# Patient Record
Sex: MALE | Race: WHITE | Employment: OTHER | URBAN - METROPOLITAN AREA
[De-identification: names, ages, dates, MRNs, and addresses within clinical notes are randomized per-mention and may not be internally consistent; named-entity substitution may affect disease eponyms.]

---

## 2018-02-01 ENCOUNTER — OFFICE VISIT (OUTPATIENT)
Dept: INTERNAL MEDICINE CLINIC | Age: 73
End: 2018-02-01
Payer: MEDICARE

## 2018-02-01 VITALS
HEART RATE: 69 BPM | HEIGHT: 69 IN | DIASTOLIC BLOOD PRESSURE: 78 MMHG | BODY MASS INDEX: 29.47 KG/M2 | OXYGEN SATURATION: 98 % | WEIGHT: 199 LBS | SYSTOLIC BLOOD PRESSURE: 132 MMHG

## 2018-02-01 DIAGNOSIS — G89.29 CHRONIC RIGHT SHOULDER PAIN: Primary | ICD-10-CM

## 2018-02-01 DIAGNOSIS — F43.20 ADJUSTMENT DISORDER, UNSPECIFIED TYPE: ICD-10-CM

## 2018-02-01 DIAGNOSIS — Z98.890 HISTORY OF SHOULDER SURGERY: ICD-10-CM

## 2018-02-01 DIAGNOSIS — Z85.46 HISTORY OF PROSTATE CANCER: ICD-10-CM

## 2018-02-01 DIAGNOSIS — K63.5 POLYP OF COLON, UNSPECIFIED PART OF COLON, UNSPECIFIED TYPE: ICD-10-CM

## 2018-02-01 DIAGNOSIS — Z77.098 AGENT ORANGE EXPOSURE: ICD-10-CM

## 2018-02-01 DIAGNOSIS — M25.511 CHRONIC RIGHT SHOULDER PAIN: Primary | ICD-10-CM

## 2018-02-01 DIAGNOSIS — Z00.00 PREVENTATIVE HEALTH CARE: ICD-10-CM

## 2018-02-01 DIAGNOSIS — F10.11 HISTORY OF ALCOHOL ABUSE: ICD-10-CM

## 2018-02-01 PROCEDURE — 99204 OFFICE O/P NEW MOD 45 MIN: CPT | Performed by: NURSE PRACTITIONER

## 2018-02-01 RX ORDER — VITAMIN B COMPLEX
100 TABLET ORAL DAILY
Qty: 30 CAPSULE | Refills: 0
Start: 2018-02-01

## 2018-02-01 RX ORDER — UREA 10 %
800 LOTION (ML) TOPICAL DAILY
Qty: 30 TABLET | Refills: 3
Start: 2018-02-01

## 2018-02-01 RX ORDER — ARGININE HCL 1000 MG
1800 TABLET ORAL 2 TIMES DAILY
Qty: 30 TABLET | Refills: 0
Start: 2018-02-01

## 2018-02-01 RX ORDER — VIT C/B6/B5/MAGNESIUM/HERB 173 50-5-6-5MG
500 CAPSULE ORAL DAILY
Qty: 30 CAPSULE | Refills: 0
Start: 2018-02-01

## 2018-02-01 RX ORDER — CHOLECALCIFEROL (VITAMIN D3) 125 MCG
500 CAPSULE ORAL 2 TIMES DAILY
Qty: 30 TABLET | Refills: 3
Start: 2018-02-01

## 2018-02-01 RX ORDER — FERROUS SULFATE 7.5 MG/0.5
65 SYRINGE (EA) ORAL 2 TIMES DAILY
COMMUNITY
End: 2018-03-05 | Stop reason: ALTCHOICE

## 2018-02-01 RX ORDER — L. RHAMNOSUS GG/INULIN 20B-200 MG
200 CAPSULE ORAL 2 TIMES DAILY
Qty: 60 CAPSULE | Refills: 5
Start: 2018-02-01

## 2018-02-01 RX ORDER — ASCORBIC ACID 500 MG
500 TABLET ORAL 2 TIMES DAILY
COMMUNITY

## 2018-02-01 RX ORDER — NIACINAMIDE 500 MG
400 TABLET, EXTENDED RELEASE ORAL 2 TIMES DAILY
Qty: 180 CAPSULE | Refills: 0
Start: 2018-02-01 | End: 2018-11-14

## 2018-02-01 RX ORDER — GLUCOSAMINE/CHONDR SU A SOD 750-600 MG
750 TABLET ORAL DAILY
Qty: 30 CAPSULE | Refills: 0
Start: 2018-02-01 | End: 2018-11-14

## 2018-02-01 RX ORDER — IBUPROFEN 800 MG/1
800 TABLET ORAL 2 TIMES DAILY
COMMUNITY
End: 2018-03-29 | Stop reason: DRUGHIGH

## 2018-02-01 RX ORDER — GARLIC 180 MG
1000 TABLET, DELAYED RELEASE (ENTERIC COATED) ORAL 2 TIMES DAILY
Qty: 60 CAPSULE | Refills: 0
Start: 2018-02-01 | End: 2018-11-14

## 2018-02-01 RX ORDER — MULTIVITAMIN
1 TABLET ORAL DAILY
Qty: 30 TABLET | Refills: 0
Start: 2018-02-01

## 2018-02-01 RX ORDER — LANOLIN ALCOHOL/MO/W.PET/CERES
50 CREAM (GRAM) TOPICAL DAILY
Qty: 30 TABLET | Refills: 3
Start: 2018-02-01

## 2018-02-01 ASSESSMENT — PATIENT HEALTH QUESTIONNAIRE - PHQ9
SUM OF ALL RESPONSES TO PHQ QUESTIONS 1-9: 1
1. LITTLE INTEREST OR PLEASURE IN DOING THINGS: 0
SUM OF ALL RESPONSES TO PHQ9 QUESTIONS 1 & 2: 1
2. FEELING DOWN, DEPRESSED OR HOPELESS: 1

## 2018-02-01 ASSESSMENT — ENCOUNTER SYMPTOMS
BLOOD IN STOOL: 0
SHORTNESS OF BREATH: 0
ABDOMINAL PAIN: 0
COUGH: 0
EYE DISCHARGE: 0

## 2018-02-01 NOTE — TELEPHONE ENCOUNTER
All medication pending to be added to pt medication list   No print on all for Rx do not send to pharmacy

## 2018-02-01 NOTE — PROGRESS NOTES
[facility] PHYSICIANS  [facility]  [address]  [address] [facility] [address]  Dept: [phone]  Dept Fax: [phone]    Christianne Mendiola is a 71 y.o. male who presents today for his medical conditions/complaints as noted below. Christianne Mendiola is c/o of New Patient (Pt here today for new patient appt ); Annual Exam (pt will like yearly physical ); Rash (pt has spots on both legs and back that he will like looked over ); and Shoulder Pain (right shoulder pain, pt had total shoulder replacement 02/2017)        HPI:     Patient presents with:  New Patient: Pt here today for new patient appt , moved here from [facility]   Annual Exam: pt will like yearly physical   Rash: pt has spots on both legs and back that he will like looked over R leg x 1 yr; L leg x 1 mo. 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 Used arm/shoulder extensively after surg. Surg done in [place] , strength not back to baseline taking nsaids since surg. H/o Prostate CA , saw URO minimally   Upset about wt gain, loss of phys activity joined Y   H/o colon polyps.  recomm colonoscopy q 5   [facility], he does not prefer to go         Past Medical History:   Diagnosis Date    Agent orange exposure     Alcoholism ([address]     recovery 1995    Anemia     Cancer ([address]     Prostate-  radiation complete 12/2010    Colon polyps     colonoscopy recomm q 5 y     Depression     Hyperlipidemia     Hypertension     prior to wt loss     Osteoarthritis     PTSD (post-traumatic stress disorder)       Past Surgical History:   Procedure Laterality Date    LAMINECTOMY      C4 C5  1995    SHOULDER SURGERY Right     replacement 2017        Family History   Problem Relation Age of Onset    Cancer Mother      lung mets to brain     Cancer Brother      prostate        Social History   Substance Use Topics    Smoking status: Former Smoker     Packs/day: 1.00     Years: 50.00    Smokeless tobacco: Never Used      Comment: quit 2000    Alcohol use No      Comment: quit 1990s ; prev qd drinking       Current Outpatient Prescriptions   Medication Sig Dispense Refill    vitamin C (ASCORBIC ACID) 500 MG tablet Take 500 mg by mouth 2 times daily      ibuprofen (ADVIL;MOTRIN) 800 MG tablet Take 800 mg by mouth 2 times daily      ferrous sulfate (CANDACE-IN-SOL) 75 (15 Fe) MG/ML solution Take 65 mg by mouth 2 times daily      aspirin 81 MG tablet Take 81 mg by mouth daily      magnesium hydroxide (MILK OF MAGNESIA) 400 MG/5ML suspension Take by mouth daily as needed for Constipation       No current facility-administered medications for this visit. No Known Allergies      Subjective:      Review of Systems   Constitutional: Negative for activity change, chills, fatigue and fever. Eyes: Negative for discharge and visual disturbance. Wears glasses    Respiratory: Negative for cough and shortness of breath. Cardiovascular: Negative for chest pain and leg swelling. Gastrointestinal: Negative for abdominal pain and blood in stool. Endocrine: Negative for cold intolerance and heat intolerance. Genitourinary: Negative for dysuria and flank pain. Incomplete emptying    Musculoskeletal: Positive for arthralgias. Negative for joint swelling and myalgias. Skin: Positive for rash. Negative for pallor. Neurological: Negative for dizziness and headaches. Psychiatric/Behavioral: Positive for dysphoric mood. Negative for hallucinations and suicidal ideas. The patient is not nervous/anxious. R/t moving here from [place], care giving, surrounded by family/noise        Objective:     Physical Exam   Constitutional: He is oriented to person, place, and time. He appears well-developed and well-nourished. HENT:   Head: Normocephalic and atraumatic. Eyes: Conjunctivae and EOM are normal. Right eye exhibits no discharge. Left eye exhibits no discharge. Right conjunctiva is not injected.  Right conjunctiva has no hemorrhage. Left conjunctiva is not injected. Left conjunctiva has no hemorrhage. No scleral icterus. Right eye exhibits normal extraocular motion and no nystagmus. Left eye exhibits normal extraocular motion and no nystagmus. Wearing glasses    Neck: Normal range of motion. Neck supple. Carotid bruit is not present. No tracheal deviation present. No thyromegaly present. Cardiovascular: Normal rate, regular rhythm and normal heart sounds. Pulses:       Carotid pulses are 2+ on the right side, and 2+ on the left side. Radial pulses are 2+ on the right side, and 2+ on the left side. Dorsalis pedis pulses are 2+ on the right side, and 2+ on the left side. Pulmonary/Chest: Effort normal and breath sounds normal. He has no wheezes. He has no rales. Abdominal: Soft. Bowel sounds are normal. He exhibits no distension and no mass. There is no tenderness. There is no rebound and no guarding. Musculoskeletal: He exhibits no edema. R shldr rom wnl but assoc w pain abduction and ER. No pain IR or flexion/ext. No crepitus noted    Lymphadenopathy:        Head (right side): No submental, no submandibular and no tonsillar adenopathy present. Head (left side): No submental, no submandibular and no tonsillar adenopathy present. He has no cervical adenopathy. Neurological: He is alert and oriented to person, place, and time. Upper/lower extrem strength 5/5    Skin: Skin is warm and dry. Psychiatric: He has a normal mood and affect. Nursing note and vitals reviewed. /78   Pulse 69   Ht 5' 8.5\" (1.74 m)   Wt 199 lb (90.3 kg)   SpO2 98%   BMI 29.81 kg/m²     CBC: No results found for: WBC, RBC, HGB, HCT, MCV, MCH, MCHC, RDW, PLT, MPV  CMP:  No results found for: NA, K, CL, CO2, BUN, CREATININE, GFRAA, AGRATIO, LABGLOM, GLUCOSE, PROT, LABALBU, CALCIUM, BILITOT, ALKPHOS, AST, ALT  No results found for: LABA1C        Assessment:      1.  Chronic right shoulder pain  [place], [address]   2. Polyp of colon, unspecified part of colon, unspecified type     3. Adjustment disorder, unspecified type     4. Agent orange exposure     5. History of prostate cancer  [facility]    PSA, Diagnostic   6. History of shoulder surgery  [place]ino [place], [address]   7. History of alcohol abuse     8. Preventative health care  Comprehensive Metabolic Panel    CBC    Lipid Panel    Hepatitis C Antibody       Plan:      1. Chronic right shoulder pain  Tonya Paulino MD, [address]   2. Polyp of colon, unspecified part of colon, unspecified type     3. Adjustment disorder, unspecified type     4. Agent orange exposure     5. History of prostate cancer  [facility]    PSA, Diagnostic   6. History of shoulder surgery  Tonya Paulino MD, [address]   7. History of alcohol abuse     8. Preventative health care  Comprehensive Metabolic Panel    CBC    Lipid Panel    Hepatitis C Antibody       Leodan received counseling on the following healthy behaviors: medication adherence  Reviewed prior labs and health maintenance  Continue current medications, diet and exercise. Discussed use, benefit, and side effects of prescribed medications. Barriers to medication compliance addressed. Patient given educational materials - see patient instructions  Was a self-tracking handout given in paper form or via [employer]? Yes    Requested Prescriptions      No prescriptions requested or ordered in this encounter       All patient questions answered. Patient voiced understanding. Quality Measures    Body mass index is 29.81 kg/m². Elevated. Weight control planned discussed Healthy diet and regular exercise. BP: 132/78. Blood pressure is normal. Treatment plan consists of No treatment change needed.     Fall Risk 2/1/2018   2 or more falls in past year? no   Fall with injury in reviewed and agree with the nursing documentation.     Electronically signed by Alvino Patrick NP on 2/1/2018 at 1:43 PM

## 2018-02-12 DIAGNOSIS — Z85.46 HISTORY OF PROSTATE CANCER: ICD-10-CM

## 2018-02-12 DIAGNOSIS — Z00.00 PREVENTATIVE HEALTH CARE: ICD-10-CM

## 2018-02-12 LAB
ALBUMIN SERPL-MCNC: 3.9 G/DL
ALP BLD-CCNC: 64 U/L
ALT SERPL-CCNC: 28 U/L
ANION GAP SERPL CALCULATED.3IONS-SCNC: NORMAL MMOL/L
AST SERPL-CCNC: 24 U/L
BASOPHILS ABSOLUTE: ABNORMAL /ΜL
BASOPHILS RELATIVE PERCENT: ABNORMAL %
BILIRUB SERPL-MCNC: 0.7 MG/DL (ref 0.1–1.4)
BUN BLDV-MCNC: 23 MG/DL
CALCIUM SERPL-MCNC: 9.8 MG/DL
CHLORIDE BLD-SCNC: 105 MMOL/L
CHOLESTEROL, TOTAL: 171 MG/DL
CHOLESTEROL/HDL RATIO: 3.4
CO2: 28 MMOL/L
CREAT SERPL-MCNC: 0.8 MG/DL
EOSINOPHILS ABSOLUTE: ABNORMAL /ΜL
EOSINOPHILS RELATIVE PERCENT: ABNORMAL %
GFR CALCULATED: NORMAL
GLUCOSE BLD-MCNC: 84 MG/DL
HCT VFR BLD CALC: 40.3 % (ref 41–53)
HDLC SERPL-MCNC: 51 MG/DL (ref 35–70)
HEMOGLOBIN: 13.8 G/DL (ref 13.5–17.5)
LDL CHOLESTEROL CALCULATED: 104 MG/DL (ref 0–160)
LYMPHOCYTES ABSOLUTE: ABNORMAL /ΜL
LYMPHOCYTES RELATIVE PERCENT: ABNORMAL %
MCH RBC QN AUTO: 30.4 PG
MCHC RBC AUTO-ENTMCNC: 34.2 G/DL
MCV RBC AUTO: 89 FL
MONOCYTES ABSOLUTE: ABNORMAL /ΜL
MONOCYTES RELATIVE PERCENT: ABNORMAL %
NEUTROPHILS ABSOLUTE: ABNORMAL /ΜL
NEUTROPHILS RELATIVE PERCENT: ABNORMAL %
PLATELET # BLD: 220 K/ΜL
PMV BLD AUTO: ABNORMAL FL
POTASSIUM SERPL-SCNC: 5.2 MMOL/L
PROSTATE SPECIFIC ANTIGEN: 0.66 NG/ML
RBC # BLD: 4.52 10^6/ΜL
SODIUM BLD-SCNC: 144 MMOL/L
TOTAL PROTEIN: 6.6
TRIGL SERPL-MCNC: 81 MG/DL
VLDLC SERPL CALC-MCNC: 16 MG/DL
WBC # BLD: 6.69 10^3/ML

## 2018-02-13 ENCOUNTER — TELEPHONE (OUTPATIENT)
Dept: UROLOGY | Age: 73
End: 2018-02-13

## 2018-02-13 ENCOUNTER — OFFICE VISIT (OUTPATIENT)
Dept: UROLOGY | Age: 73
End: 2018-02-13
Payer: MEDICARE

## 2018-02-13 VITALS
HEIGHT: 69 IN | HEART RATE: 56 BPM | BODY MASS INDEX: 29.49 KG/M2 | WEIGHT: 199.08 LBS | TEMPERATURE: 97.9 F | RESPIRATION RATE: 16 BRPM | SYSTOLIC BLOOD PRESSURE: 130 MMHG | DIASTOLIC BLOOD PRESSURE: 76 MMHG

## 2018-02-13 DIAGNOSIS — N52.35 ERECTILE DYSFUNCTION FOLLOWING RADIATION THERAPY: ICD-10-CM

## 2018-02-13 DIAGNOSIS — C61 PROSTATE CANCER (HCC): Primary | ICD-10-CM

## 2018-02-13 PROCEDURE — 99204 OFFICE O/P NEW MOD 45 MIN: CPT | Performed by: UROLOGY

## 2018-02-13 ASSESSMENT — ENCOUNTER SYMPTOMS
COUGH: 0
SHORTNESS OF BREATH: 0
WHEEZING: 0
BACK PAIN: 0
EYE REDNESS: 0
VOMITING: 0
NAUSEA: 0
ABDOMINAL PAIN: 0
COLOR CHANGE: 0
EYE PAIN: 0

## 2018-02-13 NOTE — PROGRESS NOTES
[facility]  [facility]OLOGY SPECIALISTS - [place]  [address]  [address]  [address] [place] [id number]  Dept: [phone]  Dept Fax: [address] [facility] Urology Office Note - New patient    Patient:  Guera Bagley  YOB: 1945  Date: 2/13/2018    The patient is a 71 y.o. male who presents today for evaluation of the following problems:   Chief Complaint   Patient presents with    Prostate Cancer     hx, [place]    referred by Cash Morales NP.      HPI  Here from [place] as a New Pt with Dx of prostate cancer in 2010 tx with radiation, had no ADT. His PSA originally was over 4 at [facility] and he has noted the PSA's have decreased since radiation. His records have not come in but he recalls his most recent PSA in [place] less than one. He has weak stream, intermittency, and nocturia x4. He is currently on no medication for these s/s. He takes a lot of herbs. He has no dysuria, no hematuria, no stool issues. His PSA is less that 0.66. Overall, he has done well with the cancer. He has a stream that stops and starts and he dribbles at the end. He does not have severe frequency. He does not have any leakage. He has problems with obtaining and maintaining erections. He has only tried natural supplements. He can obtain an erection, but it is weak and it fades. (Patient's old records have been requested, reviewed and summarized in today's note.)    Summary of old records: N/A    Additional History: N/A    Procedures Today: N/A    Last several PSA's:  Lab Results   Component Value Date    PSA 0.66 02/07/2018     Last total testosterone:  No results found for: TESTOSTERONE  Urinalysis today:  No results found for this visit on 02/13/18. AUA Symptom Score (2/13/2018):   INCOMPLETE EMPTYING: How often have you had the sensation of not emptying your bladder?: Less than 1 to 5 times  FREQUENCY: How often do you have to urinate less than every two hours?: Less than 1 to 5

## 2018-02-13 NOTE — LETTER
[facility]  [facility] - [place]  [address]  [address]  [address] [facility] [address]  Dept: [phone]  Dept Fax: [phone]        2/13/18    Patient: Los Guthrie  YOB: 1945    Dear Nii Curiel NP,    I had the pleasure of seeing one of your patients, Unknown Seats today in the office today. Below are the relevant portions of my assessment and plan of care. IMPRESSION:     1. Prostate cancer ([address]    2. Erectile dysfunction following radiation therapy        PLAN:        Overall doing well  PSA is stable. Discussed Flomax for intermittent stream and post void dribble, wants to hold off for now. He is interested in Viagra. F/U in 6 months. Prescriptions Ordered:  No orders of the defined types were placed in this encounter. Orders Placed:  No orders of the defined types were placed in this encounter. Thank you for allowing me to participate in the care of this patient. I will keep you updated on this patient's follow up and I look forward to serving you and your patients again in the future.         Pineda Ramirez MD

## 2018-02-16 ENCOUNTER — TELEPHONE (OUTPATIENT)
Dept: ORTHOPEDIC SURGERY | Age: 73
End: 2018-02-16

## 2018-02-27 ENCOUNTER — OFFICE VISIT (OUTPATIENT)
Dept: ORTHOPEDIC SURGERY | Age: 73
End: 2018-02-27
Payer: MEDICARE

## 2018-02-27 ENCOUNTER — HOSPITAL ENCOUNTER (OUTPATIENT)
Age: 73
Discharge: HOME | End: 2018-02-27
Payer: MEDICARE

## 2018-02-27 DIAGNOSIS — M25.511 CHRONIC RIGHT SHOULDER PAIN: ICD-10-CM

## 2018-02-27 DIAGNOSIS — G89.29 CHRONIC RIGHT SHOULDER PAIN: ICD-10-CM

## 2018-02-27 DIAGNOSIS — M25.511 CHRONIC RIGHT SHOULDER PAIN: Primary | ICD-10-CM

## 2018-02-27 DIAGNOSIS — G89.29 CHRONIC RIGHT SHOULDER PAIN: Primary | ICD-10-CM

## 2018-02-27 LAB
ABSOLUTE EOS #: 0.3 K/UL (ref 0–0.4)
ABSOLUTE IMMATURE GRANULOCYTE: ABNORMAL K/UL (ref 0–0.3)
ABSOLUTE LYMPH #: 1.3 K/UL (ref 1–4.8)
ABSOLUTE MONO #: 0.5 K/UL (ref 0.1–1.3)
BASOPHILS # BLD: 1 % (ref 0–2)
BASOPHILS ABSOLUTE: 0.1 K/UL (ref 0–0.2)
C-REACTIVE PROTEIN: 1.9 MG/L (ref 0–5)
DIFFERENTIAL TYPE: ABNORMAL
EOSINOPHILS RELATIVE PERCENT: 4 % (ref 0–4)
HCT VFR BLD CALC: 43.2 % (ref 41–53)
HEMOGLOBIN: 14.3 G/DL (ref 13.5–17.5)
IMMATURE GRANULOCYTES: ABNORMAL %
LYMPHOCYTES # BLD: 20 % (ref 24–44)
MCH RBC QN AUTO: 30.2 PG (ref 26–34)
MCHC RBC AUTO-ENTMCNC: 33 G/DL (ref 31–37)
MCV RBC AUTO: 91.5 FL (ref 80–100)
MONOCYTES # BLD: 8 % (ref 1–7)
NRBC AUTOMATED: ABNORMAL PER 100 WBC
PDW BLD-RTO: 14 % (ref 11.5–14.9)
PLATELET # BLD: 273 K/UL (ref 150–450)
PLATELET ESTIMATE: ABNORMAL
PMV BLD AUTO: 8.6 FL (ref 6–12)
RBC # BLD: 4.73 M/UL (ref 4.5–5.9)
RBC # BLD: ABNORMAL 10*6/UL
SEDIMENTATION RATE, ERYTHROCYTE: 3 MM (ref 0–15)
SEG NEUTROPHILS: 67 % (ref 36–66)
SEGMENTED NEUTROPHILS ABSOLUTE COUNT: 4.2 K/UL (ref 1.3–9.1)
WBC # BLD: 6.3 K/UL (ref 3.5–11)
WBC # BLD: ABNORMAL 10*3/UL

## 2018-02-27 PROCEDURE — 85651 RBC SED RATE NONAUTOMATED: CPT

## 2018-02-27 PROCEDURE — 99203 OFFICE O/P NEW LOW 30 MIN: CPT | Performed by: ORTHOPAEDIC SURGERY

## 2018-02-27 PROCEDURE — 85025 COMPLETE CBC W/AUTO DIFF WBC: CPT

## 2018-02-27 PROCEDURE — 86140 C-REACTIVE PROTEIN: CPT

## 2018-02-27 PROCEDURE — 36415 COLL VENOUS BLD VENIPUNCTURE: CPT

## 2018-03-05 ENCOUNTER — OFFICE VISIT (OUTPATIENT)
Dept: INTERNAL MEDICINE CLINIC | Age: 73
End: 2018-03-05
Payer: MEDICARE

## 2018-03-05 VITALS
BODY MASS INDEX: 27.99 KG/M2 | SYSTOLIC BLOOD PRESSURE: 118 MMHG | HEART RATE: 51 BPM | OXYGEN SATURATION: 99 % | RESPIRATION RATE: 12 BRPM | WEIGHT: 189 LBS | TEMPERATURE: 97.6 F | HEIGHT: 69 IN | DIASTOLIC BLOOD PRESSURE: 70 MMHG

## 2018-03-05 DIAGNOSIS — G89.29 CHRONIC RIGHT SHOULDER PAIN: Primary | ICD-10-CM

## 2018-03-05 DIAGNOSIS — R21 RASH: ICD-10-CM

## 2018-03-05 DIAGNOSIS — M25.511 CHRONIC RIGHT SHOULDER PAIN: Primary | ICD-10-CM

## 2018-03-05 DIAGNOSIS — Z77.098 AGENT ORANGE EXPOSURE: ICD-10-CM

## 2018-03-05 DIAGNOSIS — R39.12 WEAK URINARY STREAM: ICD-10-CM

## 2018-03-05 DIAGNOSIS — F10.11 HISTORY OF ALCOHOL ABUSE: ICD-10-CM

## 2018-03-05 DIAGNOSIS — E87.5 HYPERKALEMIA: ICD-10-CM

## 2018-03-05 PROBLEM — T17.500A MUCUS PLUGGING OF BRONCHI: Status: ACTIVE | Noted: 2018-03-05

## 2018-03-05 PROCEDURE — 99214 OFFICE O/P EST MOD 30 MIN: CPT | Performed by: NURSE PRACTITIONER

## 2018-03-05 RX ORDER — FERROUS SULFATE 325(65) MG
325 TABLET ORAL
COMMUNITY

## 2018-03-05 RX ORDER — IBUPROFEN 600 MG/1
600 TABLET ORAL EVERY 8 HOURS PRN
Qty: 90 TABLET | Refills: 1 | Status: SHIPPED | OUTPATIENT
Start: 2018-03-05 | End: 2018-11-05 | Stop reason: ALTCHOICE

## 2018-03-05 RX ORDER — MAGNESIUM OXIDE 400 MG/1
400 TABLET ORAL DAILY
COMMUNITY

## 2018-03-05 RX ORDER — IBUPROFEN 800 MG/1
TABLET ORAL
COMMUNITY
End: 2018-03-05 | Stop reason: SDUPTHER

## 2018-03-05 ASSESSMENT — ENCOUNTER SYMPTOMS
EYE DISCHARGE: 0
COUGH: 0
SHORTNESS OF BREATH: 0
ABDOMINAL PAIN: 0
BLOOD IN STOOL: 0

## 2018-03-05 NOTE — PROGRESS NOTES
Negative for pallor. Neurological: Negative for dizziness and headaches. Psychiatric/Behavioral: Negative for hallucinations and suicidal ideas. Objective:     Physical Exam   Constitutional: He is oriented to person, place, and time. He appears well-developed and well-nourished. /70   Pulse 51   Temp 97.6 °F (36.4 °C)   Resp 12   Ht 5' 8.5\" (1.74 m)   Wt 189 lb (85.7 kg)   SpO2 99%   BMI 28.32 kg/m²      HENT:   Head: Normocephalic and atraumatic. Eyes: Conjunctivae and EOM are normal. No scleral icterus. Neck: Neck supple. Cardiovascular: Normal rate, regular rhythm, normal heart sounds and intact distal pulses. Pulmonary/Chest: Effort normal and breath sounds normal. He has no wheezes. He has no rales. Abdominal: Soft. Bowel sounds are normal.   Musculoskeletal: Normal range of motion. He exhibits no edema. Legs:  Neurological: He is alert and oriented to person, place, and time. Skin: Skin is warm and dry. Psychiatric: He has a normal mood and affect. Nursing note and vitals reviewed. /70   Pulse 51   Temp 97.6 °F (36.4 °C)   Resp 12   Ht 5' 8.5\" (1.74 m)   Wt 189 lb (85.7 kg)   SpO2 99%   BMI 28.32 kg/m²     CBC:   Lab Results   Component Value Date    WBC 6.3 02/27/2018    RBC 4.73 02/27/2018    HGB 14.3 02/27/2018    HCT 43.2 02/27/2018    MCV 91.5 02/27/2018    MCH 30.2 02/27/2018    MCHC 33.0 02/27/2018    RDW 14.0 02/27/2018     02/27/2018    MPV 8.6 02/27/2018     CMP:    Lab Results   Component Value Date     02/07/2018    K 5.2 02/07/2018     02/07/2018    CO2 28 02/07/2018    BUN 23 02/07/2018    CREATININE 0.80 02/07/2018    GLUCOSE 84 02/07/2018    LABALBU 3.9 02/07/2018    CALCIUM 9.8 02/07/2018    BILITOT 0.7 02/07/2018    ALKPHOS 64 02/07/2018    AST 24 02/07/2018    ALT 28 02/07/2018     No results found for: LABA1C        Assessment:      1. Chronic right shoulder pain     2. Hyperkalemia  Potassium   3.  Weak urinary stream     4. Rash     5. Agent orange exposure     6. History of alcohol abuse         Plan:      1. Chronic right shoulder pain s/p surgery. Advised nsaid. No infection per labs     2. Hyperkalemia  .labsro   Potassium   3. Weak urinary stream  Refusing flomax. 4. Rash  No active rash; pt has flat macules, no s/sx irritation. Advised rto skin clinic when rash is active. 5. Agent orange exposure     6. History of alcohol abuse           Leodan received counseling on the following healthy behaviors: medication adherence  Reviewed prior labs and health maintenance  Continue current medications, diet and exercise. Discussed use, benefit, and side effects of prescribed medications. Barriers to medication compliance addressed. Patient given educational materials - see patient instructions  Was a self-tracking handout given in paper form or via [employer]? Yes    Requested Prescriptions     Signed Prescriptions Disp Refills    ibuprofen (ADVIL;MOTRIN) 600 MG tablet 90 tablet 1     Sig: Take 1 tablet by mouth every 8 hours as needed for Pain       All patient questions answered. Patient voiced understanding. Quality Measures    Body mass index is 28.32 kg/m². Elevated. Weight control planned discussed Healthy diet and regular exercise. BP: 118/70. Blood pressure is normal. Treatment plan consists of No treatment change needed. Fall Risk 2/1/2018   2 or more falls in past year? no   Fall with injury in past year? no     The patient does not have a history of falls. I did , complete a risk assessment for falls.  A plan of care for falls No Treatment plan indicated    Lab Results   Component Value Date    LDLCALC 104 02/07/2018    (goal LDL reduction with dx if diabetes is 50% LDL reduction)    PHQ Scores 2/1/2018   PHQ2 Score 1   PHQ9 Score 1     Interpretation of Total Score Depression Severity: 1-4 = Minimal depression, 5-9 = Mild depression, 10-14 = Moderate depression, 15-19 = Moderately

## 2018-03-06 ENCOUNTER — TELEPHONE (OUTPATIENT)
Dept: ORTHOPEDIC SURGERY | Age: 73
End: 2018-03-06

## 2018-03-06 DIAGNOSIS — M25.511 CHRONIC RIGHT SHOULDER PAIN: ICD-10-CM

## 2018-03-06 DIAGNOSIS — Z98.890 HISTORY OF SHOULDER SURGERY: Primary | ICD-10-CM

## 2018-03-06 DIAGNOSIS — Z85.46 HISTORY OF PROSTATE CANCER: ICD-10-CM

## 2018-03-06 DIAGNOSIS — G89.29 CHRONIC RIGHT SHOULDER PAIN: ICD-10-CM

## 2018-03-22 ENCOUNTER — HOSPITAL ENCOUNTER (OUTPATIENT)
Dept: NUCLEAR MEDICINE | Age: 73
Discharge: HOME | End: 2018-03-24
Payer: MEDICARE

## 2018-03-22 DIAGNOSIS — G89.29 CHRONIC RIGHT SHOULDER PAIN: ICD-10-CM

## 2018-03-22 DIAGNOSIS — M25.511 CHRONIC RIGHT SHOULDER PAIN: ICD-10-CM

## 2018-03-22 DIAGNOSIS — Z85.46 HISTORY OF PROSTATE CANCER: ICD-10-CM

## 2018-03-22 DIAGNOSIS — Z98.890 HISTORY OF SHOULDER SURGERY: ICD-10-CM

## 2018-03-22 PROCEDURE — A9503 TC99M MEDRONATE: HCPCS | Performed by: ORTHOPAEDIC SURGERY

## 2018-03-22 PROCEDURE — 3430000000 HC RX DIAGNOSTIC RADIOPHARMACEUTICAL: Performed by: ORTHOPAEDIC SURGERY

## 2018-03-22 PROCEDURE — 78315 BONE IMAGING 3 PHASE: CPT

## 2018-03-22 RX ORDER — TC 99M MEDRONATE 20 MG/10ML
25 INJECTION, POWDER, LYOPHILIZED, FOR SOLUTION INTRAVENOUS
Status: COMPLETED | OUTPATIENT
Start: 2018-03-22 | End: 2018-03-22

## 2018-03-22 RX ADMIN — Medication 28.9 MILLICURIE: at 08:20

## 2018-03-26 ENCOUNTER — PATIENT MESSAGE (OUTPATIENT)
Dept: ORTHOPEDIC SURGERY | Age: 73
End: 2018-03-26

## 2018-03-29 ENCOUNTER — OFFICE VISIT (OUTPATIENT)
Dept: FAMILY MEDICINE CLINIC | Age: 73
End: 2018-03-29
Payer: MEDICARE

## 2018-03-29 ENCOUNTER — TELEPHONE (OUTPATIENT)
Dept: FAMILY MEDICINE CLINIC | Age: 73
End: 2018-03-29

## 2018-03-29 VITALS
BODY MASS INDEX: 29.09 KG/M2 | OXYGEN SATURATION: 98 % | HEART RATE: 50 BPM | TEMPERATURE: 97.4 F | WEIGHT: 203.2 LBS | HEIGHT: 70 IN | DIASTOLIC BLOOD PRESSURE: 70 MMHG | SYSTOLIC BLOOD PRESSURE: 126 MMHG | RESPIRATION RATE: 20 BRPM

## 2018-03-29 DIAGNOSIS — M25.551 CHRONIC RIGHT HIP PAIN: Primary | ICD-10-CM

## 2018-03-29 DIAGNOSIS — G89.29 CHRONIC RIGHT HIP PAIN: Primary | ICD-10-CM

## 2018-03-29 DIAGNOSIS — M25.511 CHRONIC RIGHT SHOULDER PAIN: ICD-10-CM

## 2018-03-29 DIAGNOSIS — Z85.46 HISTORY OF PROSTATE CANCER: ICD-10-CM

## 2018-03-29 DIAGNOSIS — E87.5 HYPERKALEMIA: ICD-10-CM

## 2018-03-29 DIAGNOSIS — M48.00 SPINAL STENOSIS, UNSPECIFIED SPINAL REGION: ICD-10-CM

## 2018-03-29 DIAGNOSIS — G89.29 CHRONIC RIGHT SHOULDER PAIN: ICD-10-CM

## 2018-03-29 PROCEDURE — 99214 OFFICE O/P EST MOD 30 MIN: CPT | Performed by: NURSE PRACTITIONER

## 2018-03-29 RX ORDER — SILDENAFIL 25 MG/1
25 TABLET, FILM COATED ORAL PRN
COMMUNITY
End: 2019-09-16 | Stop reason: SDUPTHER

## 2018-03-29 ASSESSMENT — ENCOUNTER SYMPTOMS
SHORTNESS OF BREATH: 0
EYE DISCHARGE: 0
BLOOD IN STOOL: 0
ABDOMINAL PAIN: 0
COUGH: 0

## 2018-03-29 NOTE — TELEPHONE ENCOUNTER
Called [facility] pharmacy in [facility] and they did look in their data base and there is no record that patient had the Tdap or the shingles

## 2018-03-29 NOTE — PROGRESS NOTES
[address] [facility] [address]  [address]  [address] [facility] [address]  Dept: [phone]  Dept Fax: [phone]    Dong Craig is a 71 y.o. male who presents today for his medical conditions/complaints as noted below. Dong Craig is c/o of Hip Pain (started 2 weeks ago)        HPI:     Patient presents with:  R Hip Pain: recurrence started 2 weeks ago    Has h/o R hip pain, xr and MRI done in past, verbally reports were neg. eventually inj spine helped hip. Diff walking on Sun. Would like to see ortho , pain 3/10; worst 8/10.     Still walking 3-4 m qd   Still doing squats   otc; nsaid helps,   occas N/T down R leg    States AAA screen done in [facility]   Not seeing URO in [place]      occas pain r shoulder pain, + weakness         Past Medical History:   Diagnosis Date    Agent orange exposure     Alcoholism ([address]     recovery 1995    Anemia     [address] [address]     Prostate-  radiation complete 12/2010    Colon polyps     colonoscopy recomm q 5 y     Depression     Hyperlipidemia     Hypertension     prior to wt loss     Osteoarthritis     PTSD (post-traumatic stress disorder)       Past Surgical History:   Procedure Laterality Date    LAMINECTOMY      C4 C5  1995    SHOULDER SURGERY Right     replacement 2017        Family History   Problem Relation Age of Onset    Cancer Mother      lung mets to brain     Cancer Brother      prostate        Social History   Substance Use Topics    Smoking status: Former Smoker     Packs/day: 1.00     Years: 50.00    Smokeless tobacco: Never Used      Comment: quit 2000    Alcohol use No      Comment: quit 1990s ; prev qd drinking       Current Outpatient Prescriptions   Medication Sig Dispense Refill    sildenafil (VIAGRA) 25 MG tablet Take 25 mg by mouth as needed for Erectile Dysfunction      ferrous sulfate 325 (65 Fe) MG tablet Take 325 mg by mouth daily (with breakfast)      magnesium oxide (MAG-OX) 400 MG tablet Take 400 chest pain and leg swelling. Gastrointestinal: Negative for abdominal pain and blood in stool. Endocrine: Negative for cold intolerance and heat intolerance. Genitourinary: Negative for dysuria and flank pain. Musculoskeletal: Positive for arthralgias. Negative for joint swelling and myalgias. Skin: Negative for pallor and rash. Neurological: Positive for numbness. Negative for dizziness and headaches. Psychiatric/Behavioral: Negative for hallucinations and suicidal ideas. Objective:     Physical Exam   Constitutional: He is oriented to person, place, and time. He appears well-developed and well-nourished. /70 Comment: manual  Pulse 50   Temp 97.4 °F (36.3 °C) (Oral)   Resp 20   Ht 5' 9.5\" (1.765 m)   Wt 203 lb 3.2 oz (92.2 kg)   SpO2 98%   BMI 29.58 kg/m²      HENT:   Head: Normocephalic and atraumatic. Eyes: Conjunctivae and EOM are normal. No scleral icterus. Neck: Neck supple. Cardiovascular: Normal rate, regular rhythm, normal heart sounds and intact distal pulses. Pulmonary/Chest: Effort normal and breath sounds normal. He has no wheezes. He has no rales. Abdominal: Soft. Bowel sounds are normal.   Musculoskeletal: Normal range of motion. He exhibits no edema. Pain with R hip abduction and ttp hip joint, mild pain . r shoulder rom wnl. Neurological: He is alert and oriented to person, place, and time. Skin: Skin is warm and dry. Psychiatric: He has a normal mood and affect. Nursing note and vitals reviewed.     /70 Comment: manual  Pulse 50   Temp 97.4 °F (36.3 °C) (Oral)   Resp 20   Ht 5' 9.5\" (1.765 m)   Wt 203 lb 3.2 oz (92.2 kg)   SpO2 98%   BMI 29.58 kg/m²     CBC:   Lab Results   Component Value Date    WBC 6.3 02/27/2018    RBC 4.73 02/27/2018    HGB 14.3 02/27/2018    HCT 43.2 02/27/2018    MCV 91.5 02/27/2018    MCH 30.2 02/27/2018    MCHC 33.0 02/27/2018    RDW 14.0 02/27/2018     02/27/2018    MPV 8.6 02/27/2018     CMP:

## 2018-04-04 ENCOUNTER — PATIENT MESSAGE (OUTPATIENT)
Dept: FAMILY MEDICINE CLINIC | Age: 73
End: 2018-04-04

## 2018-04-05 ENCOUNTER — HOSPITAL ENCOUNTER (OUTPATIENT)
Dept: GENERAL RADIOLOGY | Age: 73
Discharge: HOME | End: 2018-04-07
Payer: MEDICARE

## 2018-04-05 ENCOUNTER — HOSPITAL ENCOUNTER (OUTPATIENT)
Age: 73
Discharge: HOME | End: 2018-04-07
Payer: MEDICARE

## 2018-04-05 DIAGNOSIS — M25.551 CHRONIC RIGHT HIP PAIN: ICD-10-CM

## 2018-04-05 DIAGNOSIS — G89.29 CHRONIC RIGHT HIP PAIN: ICD-10-CM

## 2018-04-05 PROCEDURE — 73502 X-RAY EXAM HIP UNI 2-3 VIEWS: CPT

## 2018-04-06 ENCOUNTER — OFFICE VISIT (OUTPATIENT)
Dept: ORTHOPEDIC SURGERY | Age: 73
End: 2018-04-06
Payer: MEDICARE

## 2018-04-06 VITALS — WEIGHT: 200 LBS | BODY MASS INDEX: 28.63 KG/M2 | HEIGHT: 70 IN

## 2018-04-06 DIAGNOSIS — M25.551 RIGHT HIP PAIN: ICD-10-CM

## 2018-04-06 DIAGNOSIS — M70.61 TROCHANTERIC BURSITIS OF RIGHT HIP: Primary | ICD-10-CM

## 2018-04-06 PROCEDURE — 99213 OFFICE O/P EST LOW 20 MIN: CPT | Performed by: ORTHOPAEDIC SURGERY

## 2018-04-06 PROCEDURE — 20610 DRAIN/INJ JOINT/BURSA W/O US: CPT | Performed by: ORTHOPAEDIC SURGERY

## 2018-04-06 RX ORDER — LIDOCAINE HYDROCHLORIDE 10 MG/ML
5 INJECTION, SOLUTION INFILTRATION; PERINEURAL ONCE
Status: COMPLETED | OUTPATIENT
Start: 2018-04-06 | End: 2018-04-06

## 2018-04-06 RX ORDER — TRIAMCINOLONE ACETONIDE 40 MG/ML
40 INJECTION, SUSPENSION INTRA-ARTICULAR; INTRAMUSCULAR ONCE
Status: COMPLETED | OUTPATIENT
Start: 2018-04-06 | End: 2018-04-06

## 2018-04-06 RX ADMIN — TRIAMCINOLONE ACETONIDE 40 MG: 40 INJECTION, SUSPENSION INTRA-ARTICULAR; INTRAMUSCULAR at 12:21

## 2018-04-06 RX ADMIN — LIDOCAINE HYDROCHLORIDE 5 ML: 10 INJECTION, SOLUTION INFILTRATION; PERINEURAL at 12:21

## 2018-04-09 PROBLEM — D64.9 ANEMIA: Status: ACTIVE | Noted: 2018-04-09

## 2018-04-09 PROBLEM — K57.90 DIVERTICULOSIS: Status: ACTIVE | Noted: 2018-04-09

## 2018-04-09 PROBLEM — K29.70 GASTRITIS: Status: ACTIVE | Noted: 2018-04-09

## 2018-04-12 ENCOUNTER — HOSPITAL ENCOUNTER (OUTPATIENT)
Dept: PHYSICAL THERAPY | Age: 73
Setting detail: THERAPIES SERIES
Discharge: HOME | End: 2018-04-12
Payer: MEDICARE

## 2018-04-12 PROCEDURE — G8978 MOBILITY CURRENT STATUS: HCPCS

## 2018-04-12 PROCEDURE — G8979 MOBILITY GOAL STATUS: HCPCS

## 2018-04-12 PROCEDURE — 97110 THERAPEUTIC EXERCISES: CPT

## 2018-04-12 PROCEDURE — 97161 PT EVAL LOW COMPLEX 20 MIN: CPT

## 2018-04-17 ENCOUNTER — HOSPITAL ENCOUNTER (OUTPATIENT)
Dept: PHYSICAL THERAPY | Age: 73
Setting detail: THERAPIES SERIES
Discharge: HOME | End: 2018-04-17
Payer: MEDICARE

## 2018-04-17 PROCEDURE — 97110 THERAPEUTIC EXERCISES: CPT

## 2018-04-17 ASSESSMENT — PAIN DESCRIPTION - ORIENTATION: ORIENTATION: RIGHT

## 2018-04-17 ASSESSMENT — PAIN DESCRIPTION - LOCATION: LOCATION: HIP

## 2018-04-17 ASSESSMENT — PAIN DESCRIPTION - PAIN TYPE: TYPE: CHRONIC PAIN

## 2018-04-17 ASSESSMENT — PAIN SCALES - GENERAL: PAINLEVEL_OUTOF10: 1

## 2018-04-19 ENCOUNTER — HOSPITAL ENCOUNTER (OUTPATIENT)
Dept: PHYSICAL THERAPY | Age: 73
Setting detail: THERAPIES SERIES
Discharge: HOME | End: 2018-04-19
Payer: MEDICARE

## 2018-04-19 PROCEDURE — 97110 THERAPEUTIC EXERCISES: CPT

## 2018-04-23 ENCOUNTER — HOSPITAL ENCOUNTER (OUTPATIENT)
Dept: PHYSICAL THERAPY | Age: 73
Setting detail: THERAPIES SERIES
Discharge: HOME | End: 2018-04-23
Payer: MEDICARE

## 2018-04-23 PROCEDURE — 97110 THERAPEUTIC EXERCISES: CPT

## 2018-04-23 PROCEDURE — 97140 MANUAL THERAPY 1/> REGIONS: CPT

## 2018-04-23 ASSESSMENT — PAIN DESCRIPTION - PAIN TYPE: TYPE: CHRONIC PAIN

## 2018-04-23 ASSESSMENT — PAIN DESCRIPTION - LOCATION: LOCATION: HIP

## 2018-04-23 ASSESSMENT — PAIN SCALES - GENERAL: PAINLEVEL_OUTOF10: 2

## 2018-04-23 ASSESSMENT — PAIN DESCRIPTION - ORIENTATION: ORIENTATION: RIGHT

## 2018-04-26 ENCOUNTER — HOSPITAL ENCOUNTER (OUTPATIENT)
Dept: PHYSICAL THERAPY | Age: 73
Setting detail: THERAPIES SERIES
Discharge: HOME | End: 2018-04-26
Payer: MEDICARE

## 2018-05-01 ENCOUNTER — HOSPITAL ENCOUNTER (OUTPATIENT)
Dept: PHYSICAL THERAPY | Age: 73
Setting detail: THERAPIES SERIES
Discharge: HOME | End: 2018-05-01
Payer: MEDICARE

## 2018-05-01 PROCEDURE — G8979 MOBILITY GOAL STATUS: HCPCS

## 2018-05-01 PROCEDURE — G8980 MOBILITY D/C STATUS: HCPCS

## 2018-05-03 ENCOUNTER — APPOINTMENT (OUTPATIENT)
Dept: PHYSICAL THERAPY | Age: 73
End: 2018-05-03
Payer: MEDICARE

## 2018-05-08 ENCOUNTER — HOSPITAL ENCOUNTER (OUTPATIENT)
Age: 73
Setting detail: SPECIMEN
Discharge: HOME | End: 2018-05-08
Payer: MEDICARE

## 2018-05-08 DIAGNOSIS — E87.5 HYPERKALEMIA: ICD-10-CM

## 2018-05-08 LAB — POTASSIUM SERPL-SCNC: 4.9 MMOL/L (ref 3.7–5.3)

## 2018-05-08 PROCEDURE — 84132 ASSAY OF SERUM POTASSIUM: CPT

## 2018-05-08 PROCEDURE — 36415 COLL VENOUS BLD VENIPUNCTURE: CPT

## 2018-05-14 PROBLEM — K22.70 BARRETT'S ESOPHAGUS: Status: ACTIVE | Noted: 2018-05-14

## 2018-06-06 ENCOUNTER — TELEPHONE (OUTPATIENT)
Dept: FAMILY MEDICINE CLINIC | Age: 73
End: 2018-06-06

## 2018-06-06 ENCOUNTER — OFFICE VISIT (OUTPATIENT)
Dept: FAMILY MEDICINE CLINIC | Age: 73
End: 2018-06-06
Payer: MEDICARE

## 2018-06-06 VITALS
HEIGHT: 69 IN | WEIGHT: 180.8 LBS | TEMPERATURE: 97 F | OXYGEN SATURATION: 99 % | DIASTOLIC BLOOD PRESSURE: 59 MMHG | SYSTOLIC BLOOD PRESSURE: 103 MMHG | RESPIRATION RATE: 20 BRPM | BODY MASS INDEX: 26.78 KG/M2 | HEART RATE: 48 BPM

## 2018-06-06 DIAGNOSIS — M25.511 CHRONIC RIGHT SHOULDER PAIN: Primary | ICD-10-CM

## 2018-06-06 DIAGNOSIS — G89.29 CHRONIC LEFT HIP PAIN: ICD-10-CM

## 2018-06-06 DIAGNOSIS — M25.552 CHRONIC LEFT HIP PAIN: ICD-10-CM

## 2018-06-06 DIAGNOSIS — G89.29 CHRONIC RIGHT SHOULDER PAIN: Primary | ICD-10-CM

## 2018-06-06 DIAGNOSIS — I95.9 HYPOTENSION, UNSPECIFIED HYPOTENSION TYPE: ICD-10-CM

## 2018-06-06 DIAGNOSIS — R00.1 BRADYCARDIA: ICD-10-CM

## 2018-06-06 PROCEDURE — 99214 OFFICE O/P EST MOD 30 MIN: CPT | Performed by: NURSE PRACTITIONER

## 2018-06-06 ASSESSMENT — ENCOUNTER SYMPTOMS
SHORTNESS OF BREATH: 0
BLOOD IN STOOL: 0
EYE DISCHARGE: 0
COUGH: 0
ABDOMINAL PAIN: 0

## 2018-06-29 ENCOUNTER — OFFICE VISIT (OUTPATIENT)
Dept: GASTROENTEROLOGY | Age: 73
End: 2018-06-29
Payer: MEDICARE

## 2018-06-29 VITALS
BODY MASS INDEX: 25.44 KG/M2 | DIASTOLIC BLOOD PRESSURE: 55 MMHG | OXYGEN SATURATION: 99 % | HEART RATE: 50 BPM | SYSTOLIC BLOOD PRESSURE: 113 MMHG | WEIGHT: 173.5 LBS

## 2018-06-29 DIAGNOSIS — K22.70 BARRETT'S ESOPHAGUS WITHOUT DYSPLASIA: Primary | ICD-10-CM

## 2018-06-29 DIAGNOSIS — K62.5 RECTAL BLEED: ICD-10-CM

## 2018-06-29 PROCEDURE — 99204 OFFICE O/P NEW MOD 45 MIN: CPT | Performed by: INTERNAL MEDICINE

## 2018-06-29 ASSESSMENT — ENCOUNTER SYMPTOMS
ABDOMINAL PAIN: 0
DIARRHEA: 0
SINUS PAIN: 0
ABDOMINAL DISTENTION: 0
COUGH: 0
SINUS PRESSURE: 0
WHEEZING: 0
NAUSEA: 0
TROUBLE SWALLOWING: 0
RECTAL PAIN: 0
ANAL BLEEDING: 0
CONSTIPATION: 0
BLOOD IN STOOL: 1
SHORTNESS OF BREATH: 0
VOMITING: 0
SORE THROAT: 0

## 2018-08-21 ENCOUNTER — OFFICE VISIT (OUTPATIENT)
Dept: UROLOGY | Age: 73
End: 2018-08-21
Payer: MEDICARE

## 2018-08-21 VITALS — TEMPERATURE: 97.6 F | SYSTOLIC BLOOD PRESSURE: 130 MMHG | DIASTOLIC BLOOD PRESSURE: 71 MMHG | HEART RATE: 42 BPM

## 2018-08-21 DIAGNOSIS — C61 PROSTATE CANCER (HCC): Primary | ICD-10-CM

## 2018-08-21 DIAGNOSIS — N52.35 ERECTILE DYSFUNCTION FOLLOWING RADIATION THERAPY: ICD-10-CM

## 2018-08-21 PROCEDURE — 99214 OFFICE O/P EST MOD 30 MIN: CPT | Performed by: UROLOGY

## 2018-08-21 ASSESSMENT — ENCOUNTER SYMPTOMS
COLOR CHANGE: 0
BACK PAIN: 0
NAUSEA: 0
VOMITING: 0
EYE PAIN: 0
SHORTNESS OF BREATH: 0
ABDOMINAL PAIN: 0
WHEEZING: 0
EYE REDNESS: 0
COUGH: 0

## 2018-08-24 ENCOUNTER — TELEPHONE (OUTPATIENT)
Dept: GASTROENTEROLOGY | Age: 73
End: 2018-08-24

## 2018-08-29 ENCOUNTER — HOSPITAL ENCOUNTER (OUTPATIENT)
Age: 73
Setting detail: OUTPATIENT SURGERY
Discharge: HOME | End: 2018-08-29
Attending: INTERNAL MEDICINE | Admitting: INTERNAL MEDICINE
Payer: MEDICARE

## 2018-08-29 ENCOUNTER — ANESTHESIA EVENT (OUTPATIENT)
Dept: ENDOSCOPY | Age: 73
End: 2018-08-29
Payer: MEDICARE

## 2018-08-29 ENCOUNTER — ANESTHESIA (OUTPATIENT)
Dept: ENDOSCOPY | Age: 73
End: 2018-08-29
Payer: MEDICARE

## 2018-08-29 VITALS
OXYGEN SATURATION: 99 % | RESPIRATION RATE: 18 BRPM | DIASTOLIC BLOOD PRESSURE: 66 MMHG | SYSTOLIC BLOOD PRESSURE: 135 MMHG

## 2018-08-29 VITALS
OXYGEN SATURATION: 100 % | HEART RATE: 59 BPM | RESPIRATION RATE: 16 BRPM | BODY MASS INDEX: 23.62 KG/M2 | WEIGHT: 165 LBS | SYSTOLIC BLOOD PRESSURE: 128 MMHG | DIASTOLIC BLOOD PRESSURE: 71 MMHG | TEMPERATURE: 97.8 F | HEIGHT: 70 IN

## 2018-08-29 PROCEDURE — 7100000010 HC PHASE II RECOVERY - FIRST 15 MIN: Performed by: INTERNAL MEDICINE

## 2018-08-29 PROCEDURE — 2580000003 HC RX 258: Performed by: INTERNAL MEDICINE

## 2018-08-29 PROCEDURE — 2500000003 HC RX 250 WO HCPCS: Performed by: NURSE ANESTHETIST, CERTIFIED REGISTERED

## 2018-08-29 PROCEDURE — 3609012400 HC EGD TRANSORAL BIOPSY SINGLE/MULTIPLE: Performed by: INTERNAL MEDICINE

## 2018-08-29 PROCEDURE — 2709999900 HC NON-CHARGEABLE SUPPLY: Performed by: INTERNAL MEDICINE

## 2018-08-29 PROCEDURE — 43239 EGD BIOPSY SINGLE/MULTIPLE: CPT | Performed by: INTERNAL MEDICINE

## 2018-08-29 PROCEDURE — 6360000002 HC RX W HCPCS: Performed by: NURSE ANESTHETIST, CERTIFIED REGISTERED

## 2018-08-29 PROCEDURE — 7100000011 HC PHASE II RECOVERY - ADDTL 15 MIN: Performed by: INTERNAL MEDICINE

## 2018-08-29 PROCEDURE — 88305 TISSUE EXAM BY PATHOLOGIST: CPT

## 2018-08-29 PROCEDURE — 3700000000 HC ANESTHESIA ATTENDED CARE: Performed by: INTERNAL MEDICINE

## 2018-08-29 RX ORDER — GLYCOPYRROLATE 1 MG/5 ML
SYRINGE (ML) INTRAVENOUS PRN
Status: DISCONTINUED | OUTPATIENT
Start: 2018-08-29 | End: 2018-08-29 | Stop reason: SDUPTHER

## 2018-08-29 RX ORDER — PROPOFOL 10 MG/ML
INJECTION, EMULSION INTRAVENOUS PRN
Status: DISCONTINUED | OUTPATIENT
Start: 2018-08-29 | End: 2018-08-29 | Stop reason: SDUPTHER

## 2018-08-29 RX ORDER — SODIUM CHLORIDE 9 MG/ML
INJECTION, SOLUTION INTRAVENOUS ONCE
Status: COMPLETED | OUTPATIENT
Start: 2018-08-29 | End: 2018-08-29

## 2018-08-29 RX ADMIN — Medication 0.2 MG: at 13:31

## 2018-08-29 RX ADMIN — Medication 0.2 MG: at 13:33

## 2018-08-29 RX ADMIN — PROPOFOL 150 MG: 10 INJECTION, EMULSION INTRAVENOUS at 13:31

## 2018-08-29 RX ADMIN — SODIUM CHLORIDE: 9 INJECTION, SOLUTION INTRAVENOUS at 13:29

## 2018-08-29 ASSESSMENT — PAIN SCALES - GENERAL
PAINLEVEL_OUTOF10: 0

## 2018-08-29 ASSESSMENT — PAIN - FUNCTIONAL ASSESSMENT: PAIN_FUNCTIONAL_ASSESSMENT: 0-10

## 2018-08-29 NOTE — ANESTHESIA PRE PROCEDURE
HAKEEM Gerber CNP   L-Arginine 1000 MG TABS Take 1,800 mg by mouth 2 times daily 2/1/18   HAKEEM Gerber CNP   Multiple Vitamin (MULTI-DAY VITAMINS) TABS Take 1 Dose by mouth daily 2/1/18   HAKEEM Gerber CNP   Glucosamine Sulfate 1500 MG PACK Take 1,500 mg by mouth daily 2/1/18   HAKEEM Gerber CNP   Chondroitin Sulfate 400 MG CAPS Take 1,200 mg by mouth daily 2/1/18   HAKEEM Gerber CNP   Methylsulfonylmethane 500 MG CAPS Take 300 mg by mouth daily 2/1/18   HAKEEM Gerber CNP   Coenzyme Q10 (COQ10) 100 MG CAPS Take 100 mg by mouth daily 2/1/18   HAKEEM Gerber CNP   Turmeric (CURCUMIN 95) 500 MG CAPS Take 500 mg by mouth daily 2/1/18   HAKEEM Gerber CNP   RA KRILL OIL CAPS Take 750 mg by mouth daily 2/1/18   HAKEEM Gerber CNP       Current medications:    Current Facility-Administered Medications   Medication Dose Route Frequency Provider Last Rate Last Dose    0.9 % sodium chloride infusion   Intravenous Once Orlando Delaney MD           Allergies:  No Known Allergies    Problem List:    Patient Active Problem List   Diagnosis Code    History of prostate cancer Z85.46    History of shoulder surgery Z98.890    Chronic right shoulder pain M25.511, G89.29    Colon polyps K63.5    Adjustment disorder F43.20    Agent orange exposure Z77.098    History of alcohol abuse Z87.898    Weak urinary stream R39.12    Hyperkalemia E87.5    Mucus plugging of bronchi J98.09    Chronic left hip pain M25.552, G89.29    Spinal stenosis M48.00    Anemia D64.9    Diverticulosis K57.90    Gastritis K29.70    Santos's esophagus K22.70    Rectal bleed K62.5       Past Medical History:        Diagnosis Date    Agent orange exposure     Alcoholism ([facility] [address]     recovery 1995    Anemia     Cancer ([facility] [address]     Prostate-  radiation complete 12/2010    Colon polyps     colonoscopy recomm q 5 y     Depression  Hyperlipidemia     Hypertension     prior to wt loss     Osteoarthritis     PTSD (post-traumatic stress disorder)        Past Surgical History:        Procedure Laterality Date    LAMINECTOMY      C4 C5  1995    SHOULDER SURGERY Right     replacement 2017        Social History:    Social History   Substance Use Topics    Smoking status: Former Smoker     Packs/day: 1.00     Years: 50.00    Smokeless tobacco: Never Used      Comment: quit 2000    Alcohol use No      Comment: quit 1990s ; prev qd drinking                                 Counseling given: Not Answered      Vital Signs (Current):   Vitals:    08/29/18 1217   BP: (!) 151/66   Pulse: (!) 43   Resp: 16   Temp: 98.2 °F (36.8 °C)   TempSrc: Oral   SpO2: 99%   Weight: 165 lb (74.8 kg)   Height: 5' 10\" (1.778 m)                                              BP Readings from Last 3 Encounters:   08/29/18 (!) 151/66   08/21/18 130/71   06/29/18 (!) 113/55       NPO Status:                                                                                 BMI:   Wt Readings from Last 3 Encounters:   08/29/18 165 lb (74.8 kg)   06/29/18 173 lb 8 oz (78.7 kg)   06/06/18 180 lb 12.8 oz (82 kg)     Body mass index is 23.68 kg/m². CBC:   Lab Results   Component Value Date    WBC 6.3 02/27/2018    RBC 4.73 02/27/2018    HGB 14.3 02/27/2018    HCT 43.2 02/27/2018    MCV 91.5 02/27/2018    RDW 14.0 02/27/2018     02/27/2018       CMP:   Lab Results   Component Value Date     02/07/2018    K 4.9 05/08/2018     02/07/2018    CO2 28 02/07/2018    BUN 23 02/07/2018    CREATININE 0.80 02/07/2018    GLUCOSE 84 02/07/2018    CALCIUM 9.8 02/07/2018    BILITOT 0.7 02/07/2018    ALKPHOS 64 02/07/2018    AST 24 02/07/2018    ALT 28 02/07/2018       POC Tests: No results for input(s): POCGLU, POCNA, POCK, POCCL, POCBUN, POCHEMO, POCHCT in the last 72 hours.     Coags: No results found for: PROTIME, INR, APTT    HCG (If Applicable): No results found for: PREGTESTUR, PREGSERUM, HCG, HCGQUANT     ABGs: No results found for: PHART, PO2ART, EQL7UZF, ZQD0HNF, BEART, B9WJNWWT     Type & Screen (If Applicable):  No results found for: LABABO, [address]    Anesthesia Evaluation  Patient summary reviewed no history of anesthetic complications:   Airway: Mallampati: II  TM distance: >3 FB   Neck ROM: full  Mouth opening: > = 3 FB Dental: normal exam         Pulmonary:Negative Pulmonary ROS and normal exam                               Cardiovascular:    (+) hypertension: no interval change,          Beta Blocker:  Not on Beta Blocker         Neuro/Psych:   (+) psychiatric history: stable with treatment            GI/Hepatic/Renal:            ROS comment: Santos's esophagus. .   Endo/Other:    (+) malignancy/cancer. Pt had no PAT visit        ROS comment: H/o Prostate cancer. Abdominal:           Vascular:                                        Anesthesia Plan      MAC     ASA 2       Induction: intravenous. Anesthetic plan and risks discussed with patient. Plan discussed with CRNA.                   Elizabeth Reed MD   8/29/2018

## 2018-08-29 NOTE — H&P
APRN - CNP   Garlic 3602 MG CAPS Take 1,200 mg by mouth 2 times daily 18   Roger Cotton, APRN - CNP   Ginseng 250 MG CAPS Take 1,000 mg by mouth 2 times daily 18   Roger Cotton, APRN - CNP   L-Arginine 1000 MG TABS Take 1,800 mg by mouth 2 times daily 18   Roger Cotton, APRN - CNP   Multiple Vitamin (MULTI-DAY VITAMINS) TABS Take 1 Dose by mouth daily 18   Roger Cotton, APRN - CNP   Glucosamine Sulfate 1500 MG PACK Take 1,500 mg by mouth daily 18   Roger Cotton, APRN - CNP   Chondroitin Sulfate 400 MG CAPS Take 1,200 mg by mouth daily 18   Roger Cotton, APRN - CNP   Methylsulfonylmethane 500 MG CAPS Take 300 mg by mouth daily 18   Roger Lingbandar, APRN - CNP   Coenzyme Q10 (COQ10) 100 MG CAPS Take 100 mg by mouth daily 18   Roger Cotton, APRN - CNP   Turmeric (CURCUMIN 95) 500 MG CAPS Take 500 mg by mouth daily 18   Roger Lingbandar, APRN - CNP   RA KRILL OIL CAPS Take 750 mg by mouth daily 18   Roger Cotton, APRN - CNP     Past Medical History    has a past medical history of Agent orange exposure; Alcoholism ([facility] [address] Anemia; Santos's esophagus; Bradycardia; Cancer ([facility] [address] Colon polyps; Depression; Hyperlipidemia; Hypertension; Osteoarthritis; and PTSD (post-traumatic stress disorder). Past Surgical History   has a past surgical history that includes laminectomy; shoulder surgery (Right); Colonoscopy; and Upper gastrointestinal endoscopy. Social History   reports that he has quit smoking. He has a 50.00 pack-year smoking history. He has never used smokeless tobacco.   reports that he does not drink alcohol. reports that he does not use drugs. Marital Status   Children 3  Occupation retired technology [email or web address]   Family History  Family Status   Relation Status    Mother     Brother (Not Specified)    Father      family history includes Cancer in his brother and mother.       OBJECTIVE: VITALS:  height is 5' 10\" (1.778 m) and weight is 165 lb (74.8 kg). His oral temperature is 98.2 °F (36.8 °C). His blood pressure is 151/66 (abnormal) and his pulse is 43 (abnormal). His respiration is 16 and oxygen saturation is 99%. CONSTITUTIONAL:alert & orientated x 3, no acute distress. Friendly. SKIN:  Warm and dry, no rashes   HEAD:  Normocephalic, atraumatic   EYES: PERRLA. EOMI  Wearing glasses  EARS:  Hearing grossly WNL. NOSE:  Nares patent. No rhinorrhea   MOUTH/THROAT:  Benign  NECK:supple, no lymphadenopathy  LUNGS: Respirations even and non-labored. Clear to auscultation bilaterally, no wheezes, rales, or rhonchi. CARDIOVASCULAR: Bradycardia. ABDOMEN: soft, non tender, non distended, no masses or organomegaly   EXTREMITIES: no edema bilateral lower extremities. Peripheral pulses are intact     IMPRESSIONS:   1. 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 PLANS:   1.  EGD    ABBEY  CASAS CNP  Electronically signed 8/29/2018 at 1:05 PM

## 2018-08-30 LAB — SURGICAL PATHOLOGY REPORT: NORMAL

## 2018-09-06 ENCOUNTER — OFFICE VISIT (OUTPATIENT)
Dept: FAMILY MEDICINE CLINIC | Age: 73
End: 2018-09-06
Payer: MEDICARE

## 2018-09-06 VITALS
OXYGEN SATURATION: 100 % | HEIGHT: 70 IN | BODY MASS INDEX: 23.79 KG/M2 | HEART RATE: 48 BPM | WEIGHT: 166.2 LBS | DIASTOLIC BLOOD PRESSURE: 67 MMHG | SYSTOLIC BLOOD PRESSURE: 123 MMHG

## 2018-09-06 DIAGNOSIS — K22.719 BARRETT'S ESOPHAGUS WITH DYSPLASIA: ICD-10-CM

## 2018-09-06 DIAGNOSIS — Z23 NEED FOR PROPHYLACTIC VACCINATION AND INOCULATION AGAINST INFLUENZA: ICD-10-CM

## 2018-09-06 DIAGNOSIS — M25.552 CHRONIC LEFT HIP PAIN: Primary | ICD-10-CM

## 2018-09-06 DIAGNOSIS — E87.5 HYPERKALEMIA: ICD-10-CM

## 2018-09-06 DIAGNOSIS — G89.29 CHRONIC LEFT HIP PAIN: Primary | ICD-10-CM

## 2018-09-06 DIAGNOSIS — D64.9 ANEMIA, UNSPECIFIED TYPE: ICD-10-CM

## 2018-09-06 PROCEDURE — 90662 IIV NO PRSV INCREASED AG IM: CPT | Performed by: NURSE PRACTITIONER

## 2018-09-06 PROCEDURE — G0008 ADMIN INFLUENZA VIRUS VAC: HCPCS | Performed by: NURSE PRACTITIONER

## 2018-09-06 PROCEDURE — 99214 OFFICE O/P EST MOD 30 MIN: CPT | Performed by: NURSE PRACTITIONER

## 2018-09-06 RX ORDER — ESOMEPRAZOLE MAGNESIUM 20 MG/1
20 FOR SUSPENSION ORAL DAILY
COMMUNITY

## 2018-09-06 ASSESSMENT — ENCOUNTER SYMPTOMS
COUGH: 0
SHORTNESS OF BREATH: 0
EYE DISCHARGE: 0
ABDOMINAL PAIN: 0
BLOOD IN STOOL: 0

## 2018-09-06 NOTE — PROGRESS NOTES
Brother         prostate        Social History   Substance Use Topics    Smoking status: Former Smoker     Packs/day: 1.00     Years: 50.00     Quit date: 1/6/2008    Smokeless tobacco: Never Used      Comment: quit 2000    Alcohol use No      Comment: quit 1990s ; prev qd drinking       Current Outpatient Prescriptions   Medication Sig Dispense Refill    esomeprazole Magnesium (NEXIUM) 20 MG PACK Take 20 mg by mouth daily      sildenafil (VIAGRA) 25 MG tablet Take 25 mg by mouth as needed for Erectile Dysfunction      ferrous sulfate 325 (65 Fe) MG tablet Take 325 mg by mouth daily (with breakfast)      magnesium oxide (MAG-OX) 400 MG tablet Take 400 mg by mouth daily      vitamin C (ASCORBIC ACID) 500 MG tablet Take 500 mg by mouth 2 times daily      aspirin 81 MG tablet Take 81 mg by mouth daily      Digestive Enzymes CAPS Take 400 mg by mouth 2 times daily 180 capsule 0    Lactobacillus-Inulin (PROBIOTIC DIGESTIVE SUPPORT) CAPS Take 200 mg by mouth 2 times daily 60 capsule 5    vitamin B-12 (CYANOCOBALAMIN) 500 MCG tablet Take 1 tablet by mouth 2 times daily 30 tablet 3    vitamin B-6 (PYRIDOXINE) 50 MG tablet Take 1 tablet by mouth daily 30 tablet 3    folic acid (FOLVITE) 834 MCG tablet Take 1 tablet by mouth daily 30 tablet 3    zinc 50 MG CAPS Take 50 mg by mouth daily 30 capsule 3    L-Arginine 1000 MG TABS Take 1,800 mg by mouth 2 times daily 30 tablet 0    Multiple Vitamin (MULTI-DAY VITAMINS) TABS Take 1 Dose by mouth daily 30 tablet 0    Glucosamine Sulfate 1500 MG PACK Take 1,500 mg by mouth daily 30 each 0    Chondroitin Sulfate 400 MG CAPS Take 1,200 mg by mouth daily 30 capsule 0    Methylsulfonylmethane 500 MG CAPS Take 300 mg by mouth daily 30 capsule 0    Coenzyme Q10 (COQ10) 100 MG CAPS Take 100 mg by mouth daily 30 capsule 0    ibuprofen (ADVIL;MOTRIN) 600 MG tablet Take 1 tablet by mouth every 8 hours as needed for Pain (Patient taking differently: Take 600 mg by mouth 3 Psychiatric: He has a normal mood and affect. Nursing note and vitals reviewed. /67   Pulse (!) 48   Ht 5' 10\" (1.778 m)   Wt 166 lb 3.2 oz (75.4 kg)   SpO2 100% Comment: resting @ RA  BMI 23.85 kg/m²     CBC:   Lab Results   Component Value Date    WBC 6.3 02/27/2018    RBC 4.73 02/27/2018    HGB 14.3 02/27/2018    HCT 43.2 02/27/2018    MCV 91.5 02/27/2018    MCH 30.2 02/27/2018    MCHC 33.0 02/27/2018    RDW 14.0 02/27/2018     02/27/2018    MPV 8.6 02/27/2018     CMP:    Lab Results   Component Value Date     02/07/2018    K 4.9 05/08/2018     02/07/2018    CO2 28 02/07/2018    BUN 23 02/07/2018    CREATININE 0.80 02/07/2018    GLUCOSE 84 02/07/2018    LABALBU 3.9 02/07/2018    CALCIUM 9.8 02/07/2018    BILITOT 0.7 02/07/2018    ALKPHOS 64 02/07/2018    AST 24 02/07/2018    ALT 28 02/07/2018     No results found for: LABA1C        Assessment:       Diagnosis Orders   1. Chronic left hip pain     2. Santos's esophagus with dysplasia     3. Anemia, unspecified type     4. Hyperkalemia     5. Need for prophylactic vaccination and inoculation against influenza  INFLUENZA, HIGH DOSE, 65 YRS +, IM, PF, PREFILL SYR, 0.5ML (FLUZONE HD)       Plan:       Diagnosis Orders   1. Chronic left hip pain  To see ortho    2. Santos's esophagus with dysplasia  Recent egd ,refusing nexium, already has    3. Anemia, unspecified type  Stable . No reperat labs needed   4. Hyperkalemia  Last in may , wnl    5. Need for prophylactic vaccination and inoculation against influenza  INFLUENZA, HIGH DOSE, 65 YRS +, IM, PF, PREFILL SYR, 0.5ML (FLUZONE HD)       Leodan received counseling on the following healthy behaviors: medication adherence  Reviewed prior labs and health maintenance  Continue current medications, diet and exercise. Discussed use, benefit, and side effects of prescribed medications. Barriers to medication compliance addressed.   Patient given educational materials - see patient

## 2018-09-12 ENCOUNTER — TELEPHONE (OUTPATIENT)
Dept: ONCOLOGY | Age: 73
End: 2018-09-12

## 2018-09-12 ENCOUNTER — TELEPHONE (OUTPATIENT)
Dept: FAMILY MEDICINE CLINIC | Age: 73
End: 2018-09-12

## 2018-09-12 NOTE — TELEPHONE ENCOUNTER
Per Ivy's request I called pts previous provider in [place] to inquire as to why we hadn't received records. Office stated they never received the release and that it is done through an outside source.  I refaxed the release and will await records

## 2018-09-14 ENCOUNTER — TELEPHONE (OUTPATIENT)
Dept: FAMILY MEDICINE CLINIC | Age: 73
End: 2018-09-14

## 2018-09-19 ENCOUNTER — HOSPITAL ENCOUNTER (OUTPATIENT)
Dept: CT IMAGING | Age: 73
Discharge: HOME | End: 2018-09-21
Payer: MEDICARE

## 2018-09-19 ENCOUNTER — HOSPITAL ENCOUNTER (OUTPATIENT)
Age: 73
Discharge: HOME | End: 2018-09-19
Payer: MEDICARE

## 2018-09-19 DIAGNOSIS — Z87.891 PERSONAL HISTORY OF TOBACCO USE: ICD-10-CM

## 2018-09-19 DIAGNOSIS — Z00.00 PREVENTATIVE HEALTH CARE: ICD-10-CM

## 2018-09-19 LAB — HEPATITIS C ANTIBODY: NONREACTIVE

## 2018-09-19 PROCEDURE — 86803 HEPATITIS C AB TEST: CPT

## 2018-09-19 PROCEDURE — 36415 COLL VENOUS BLD VENIPUNCTURE: CPT

## 2018-09-19 PROCEDURE — G0297 LDCT FOR LUNG CA SCREEN: HCPCS

## 2018-09-25 ENCOUNTER — OFFICE VISIT (OUTPATIENT)
Dept: ORTHOPEDIC SURGERY | Age: 73
End: 2018-09-25
Payer: MEDICARE

## 2018-09-25 VITALS — HEIGHT: 70 IN | WEIGHT: 170 LBS | BODY MASS INDEX: 24.34 KG/M2

## 2018-09-25 DIAGNOSIS — M70.61 GREATER TROCHANTERIC BURSITIS OF RIGHT HIP: Primary | ICD-10-CM

## 2018-09-25 PROCEDURE — 20610 DRAIN/INJ JOINT/BURSA W/O US: CPT | Performed by: ORTHOPAEDIC SURGERY

## 2018-09-25 RX ORDER — IBUPROFEN 200 MG
200 TABLET ORAL EVERY 6 HOURS PRN
COMMUNITY
End: 2018-11-05 | Stop reason: SDUPTHER

## 2018-09-25 NOTE — PROGRESS NOTES
HPI: Mr. Ted Bishop returns today for evaluation of his right hip. He was previously diagnosed with trochanteric bursitis and had a cortisone injection and therapy. He states that he had some relief of her recently has had return of pain primarily localized to the lateral aspect of the hip. He has kept up with his home exercise program.    Physical examination:  Ht 5' 10\" (1.778 m)   Wt 170 lb (77.1 kg)   BMI 24.39 kg/m²   Evaluation of patient's right hip and lower extremity demonstrates intact skin without any warmth, erythema, or swelling. He is tender to palpation over the greater trochanter and has pain with resisted hip abduction. He has full pain-free range of motion through the hip. Sensation is grossly intact light touch in all dermatomes and he has 2+ pedal pulses. Impression and plan: Mr. Ted Bishop is a 36-year-old with recurrent right hip trochanteric bursitis. We again discussed his treatment options at this time and he would like to repeat a cortisone injection as he had previously received. This was administered as outlined below. He was encouraged to keep up his home exercise program.  I'll see him back my clinic as needed. Procedure: right  hip trochanteric bursa injection  Following an appropriate discussion with the patient regarding the risks and benefits of the procedure he consented to proceed. His right hip was prepped using chlorhexadine solution. Using aseptic technique the point of maximal tenderness at the level of the right hip greater trochanter was palpated and injected with a 6 cc mixture of 1cc 40mg/ml kenalog and 5 cc of 1% lidocaine without epinephrine. A band aid was applied to the injection site. he tolerated the injection with no immediate adverse reactions.

## 2018-10-03 ENCOUNTER — PATIENT MESSAGE (OUTPATIENT)
Dept: FAMILY MEDICINE CLINIC | Age: 73
End: 2018-10-03

## 2018-10-04 ENCOUNTER — PATIENT MESSAGE (OUTPATIENT)
Dept: FAMILY MEDICINE CLINIC | Age: 73
End: 2018-10-04

## 2018-10-04 NOTE — TELEPHONE ENCOUNTER
From: Myron Aase  To: HAKEEM Perdue CNP  Sent: 10/4/2018 1:35 PM EDT  Subject: Non-Urgent Medical Question    Can you determine , looking at my previous back x-ray if this would be a subluxationof the spine. That would be covered. ----- Message -----  From: HAKEEM Perdue CNP  Sent: 10/3/2018 5:13 PM EDT  To: Myron Aase  Subject: RE: Non-Urgent Medical Question  I would first check with your insurance . It is possible a referral is not necessary. With some insurances Chiropractic treatment may not be covered. Please let us know if a referral is needed after you have obtained details from your insurance company. Thank you. Rosalva Woods CNP       ----- Message -----   From: Myron Aase   Sent: 10/3/2018 2:52 PM EDT   To: HAKEEM Perdue CNP  Subject: Non-Urgent Medical Question    I would like to get a referral to a chiropractor. My back has been bothering quite a bit lately through all of my activities and including sleeping and driving. Went for a deep tissue massage, they recommended a chiropractor.

## 2018-10-10 ENCOUNTER — TELEPHONE (OUTPATIENT)
Dept: FAMILY MEDICINE CLINIC | Age: 73
End: 2018-10-10

## 2018-10-10 DIAGNOSIS — N28.1 RENAL CYST, LEFT: ICD-10-CM

## 2018-10-18 ENCOUNTER — HOSPITAL ENCOUNTER (OUTPATIENT)
Dept: ULTRASOUND IMAGING | Age: 73
Discharge: HOME | End: 2018-10-20
Payer: MEDICARE

## 2018-10-18 DIAGNOSIS — N28.1 RENAL CYST, LEFT: ICD-10-CM

## 2018-10-18 PROCEDURE — 76775 US EXAM ABDO BACK WALL LIM: CPT

## 2018-11-05 ENCOUNTER — HOSPITAL ENCOUNTER (OUTPATIENT)
Dept: GENERAL RADIOLOGY | Age: 73
Discharge: HOME | End: 2018-11-07
Payer: MEDICARE

## 2018-11-05 ENCOUNTER — OFFICE VISIT (OUTPATIENT)
Dept: FAMILY MEDICINE CLINIC | Age: 73
End: 2018-11-05
Payer: MEDICARE

## 2018-11-05 ENCOUNTER — TELEPHONE (OUTPATIENT)
Dept: FAMILY MEDICINE CLINIC | Age: 73
End: 2018-11-05

## 2018-11-05 ENCOUNTER — PATIENT MESSAGE (OUTPATIENT)
Dept: FAMILY MEDICINE CLINIC | Age: 73
End: 2018-11-05

## 2018-11-05 ENCOUNTER — HOSPITAL ENCOUNTER (OUTPATIENT)
Age: 73
Discharge: HOME | End: 2018-11-05
Payer: MEDICARE

## 2018-11-05 ENCOUNTER — HOSPITAL ENCOUNTER (OUTPATIENT)
Age: 73
Discharge: HOME | End: 2018-11-07
Payer: MEDICARE

## 2018-11-05 VITALS
HEIGHT: 69 IN | OXYGEN SATURATION: 96 % | HEART RATE: 46 BPM | DIASTOLIC BLOOD PRESSURE: 64 MMHG | BODY MASS INDEX: 25.03 KG/M2 | SYSTOLIC BLOOD PRESSURE: 110 MMHG | WEIGHT: 169 LBS

## 2018-11-05 DIAGNOSIS — R00.1 BRADYCARDIA: Primary | ICD-10-CM

## 2018-11-05 DIAGNOSIS — M79.621 AXILLARY PAIN, RIGHT: Primary | ICD-10-CM

## 2018-11-05 DIAGNOSIS — R00.1 BRADYCARDIA: ICD-10-CM

## 2018-11-05 DIAGNOSIS — R93.7 ABNORMAL FINDINGS ON DIAGNOSTIC IMAGING OF OTHER PARTS OF MUSCULOSKELETAL SYSTEM: ICD-10-CM

## 2018-11-05 DIAGNOSIS — M79.621 AXILLARY PAIN, RIGHT: ICD-10-CM

## 2018-11-05 DIAGNOSIS — S22.41XA CLOSED FRACTURE OF MULTIPLE RIBS OF RIGHT SIDE, INITIAL ENCOUNTER: Primary | ICD-10-CM

## 2018-11-05 LAB
EKG ATRIAL RATE: 41 BPM
EKG P AXIS: 71 DEGREES
EKG P-R INTERVAL: 208 MS
EKG Q-T INTERVAL: 472 MS
EKG QRS DURATION: 106 MS
EKG QTC CALCULATION (BAZETT): 389 MS
EKG R AXIS: 36 DEGREES
EKG T AXIS: 35 DEGREES
EKG VENTRICULAR RATE: 41 BPM
TSH SERPL DL<=0.05 MIU/L-ACNC: 2.71 MIU/L (ref 0.3–5)

## 2018-11-05 PROCEDURE — 71100 X-RAY EXAM RIBS UNI 2 VIEWS: CPT

## 2018-11-05 PROCEDURE — 93005 ELECTROCARDIOGRAM TRACING: CPT

## 2018-11-05 PROCEDURE — 99214 OFFICE O/P EST MOD 30 MIN: CPT | Performed by: NURSE PRACTITIONER

## 2018-11-05 PROCEDURE — 36415 COLL VENOUS BLD VENIPUNCTURE: CPT

## 2018-11-05 PROCEDURE — 84443 ASSAY THYROID STIM HORMONE: CPT

## 2018-11-05 RX ORDER — IBUPROFEN 800 MG/1
800 TABLET ORAL EVERY 6 HOURS PRN
Qty: 120 TABLET | Refills: 0 | Status: SHIPPED | OUTPATIENT
Start: 2018-11-05 | End: 2019-03-19 | Stop reason: ALTCHOICE

## 2018-11-05 ASSESSMENT — ENCOUNTER SYMPTOMS: SHORTNESS OF BREATH: 0

## 2018-11-05 NOTE — TELEPHONE ENCOUNTER
His bradycardia is marked on ekg today. I placed referral for cardio as HR is  low 40s . Please update patient ; pt has other TE from today.

## 2018-11-05 NOTE — PROGRESS NOTES
[address] [facility] [address] [facility]  [address]  [address] [facility] [address]  Dept: [phone]  Dept Fax: [phone]    Rozella Schaumann is a 68 y.o. male who presents today for his medical conditions/complaintsas noted below. Rozella Schaumann is c/o of Chest Pain (pt wants to see if he has cracked or broken his rib. no known injury)        HPI:     Patient presents with:  R axilla, lower rib pain:  pt wants to see if he has cracked or broken his rib. no known injury  Lifts free weights mwf    Noticed pain on Fri. After rising. Did lift weight that day. Lifting did not exacerbate the pain    Skipped workout today. No unusual activity on thurs. Pain    8 /10 at worst. Now  3  /10. Constant, dull ache, ; certain mvmts make it  sharp   otc nsaids , no help    Aggrav; deep breathing, moving or lefiting improperly. Sharp   allev  ;  Standing, altering positions. Bradycardia; states low HR his whole life. Never has seen a cardio, states \" his baseline.  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    Colon polyps     colonoscopy recomm q 5 y     Depression     Hyperlipidemia     Hypertension     prior to wt loss     Osteoarthritis     PTSD (post-traumatic stress disorder)     Renal cyst, left 10/10/2018     Past Surgical History:   Procedure Laterality Date    COLONOSCOPY      LAMINECTOMY      C4 C5  1995    SHOULDER SURGERY Right     replacement 2017     UPPER GASTROINTESTINAL ENDOSCOPY      UPPER GASTROINTESTINAL ENDOSCOPY N/A 8/29/2018    EGD BIOPSY performed by Melanie Garcia MD at [facility]       Family History   Problem Relation Age of Onset    Cancer Mother         lung mets to brain     Cancer Turmeric (CURCUMIN 95) 500 MG CAPS Take 500 mg by mouth daily 30 capsule 0    RA KRILL OIL CAPS Take 750 mg by mouth daily 30 capsule 0    ibuprofen (ADVIL;MOTRIN) 800 MG tablet Take 1 tablet by mouth every 6 hours as needed for Pain 120 tablet 0    Respiratory Therapy Supplies (SPIROMETER) KIT Perform IS every 4 hours while awake 1 kit 0     No current facility-administered medications for this visit. No Known Allergies      Subjective:      Review of Systems   Constitutional: Positive for fatigue (poor sleep d/t pain). Respiratory: Negative for shortness of breath. Cardiovascular: Negative for chest pain and palpitations. Musculoskeletal:        R Sharp rib pain, lower   Psychiatric/Behavioral: Positive for sleep disturbance (rib pain). Objective:     Physical Exam   Constitutional: He is oriented to person, place, and time. He appears well-developed and well-nourished. HENT:   Head: Atraumatic. Eyes: EOM are normal.   Neurological: He is alert and oriented to person, place, and time. Psychiatric: He has a normal mood and affect. Nursing note and vitals reviewed. /64   Pulse (!) 46   Ht 5' 9\" (1.753 m)   Wt 169 lb (76.7 kg)   SpO2 96%   BMI 24.96 kg/m²     CBC:   Lab Results   Component Value Date    WBC 6.3 02/27/2018    RBC 4.73 02/27/2018    HGB 14.3 02/27/2018    HCT 43.2 02/27/2018    MCV 91.5 02/27/2018    MCH 30.2 02/27/2018    MCHC 33.0 02/27/2018    RDW 14.0 02/27/2018     02/27/2018    MPV 8.6 02/27/2018     CMP:    Lab Results   Component Value Date     02/07/2018    K 4.9 05/08/2018     02/07/2018    CO2 28 02/07/2018    BUN 23 02/07/2018    CREATININE 0.80 02/07/2018    GLUCOSE 84 02/07/2018    LABALBU 3.9 02/07/2018    CALCIUM 9.8 02/07/2018    BILITOT 0.7 02/07/2018    ALKPHOS 64 02/07/2018    AST 24 02/07/2018    ALT 28 02/07/2018     No results found for: LABA1C        Assessment:       Diagnosis Orders   1.  Axillary pain, right  XR RIBS RIGHT (2 VIEWS)   2. Bradycardia  TSH with Reflex    EKG 12 Lead       Plan:       Diagnosis Orders   1. Axillary pain, right  XR RIBS RIGHT (2 VIEWS)    Recommend nsaids for pain, If not fx. Also Suspicious for straining muscle or ligament from free weights. No other intense activity     Pt w h/o etoh abuse. Will obtain xr and treat with nsaids. Closely monitoring pain. 2. Bradycardia  TSH with Reflex     No previous ekg tracing in epic to review. Pt states low HR for long time. Seems hesitant to pursue testing d/t 'long history of this issue. Will verify no concerns with tsh and ekg order. Patient verbalizes understanding. EKG 12 Lead     Despite pain, pt's HR remaining low. Strongly  recommend testing for him   Patient verbalizes understanding. Leodan received counseling on the following healthy behaviors: medication adherence  Reviewed prior labs and health maintenance  Continue current medications, diet and exercise. Discussed use, benefit, and side effects of prescribed medications. Barriers to medication compliance addressed. Patient given educational materials - see patient instructions  Was a self-tracking handout given in paper form or via [employer]? Yes    Requested Prescriptions      No prescriptions requested or ordered in this encounter       All patient questions answered. Patient voiced understanding. Quality Measures    Body mass index is 24.96 kg/m². Normal. Weight control planned discussed Healthy diet and regular exercise. BP: 110/64. Blood pressure is normal. Treatment plan consists of No treatment change needed. Fall Risk 2/1/2018   2 or more falls in past year? no   Fall with injury in past year? no     The patient does not have a history of falls. I did , complete a risk assessment for falls.  A plan of care for falls No Treatment plan indicated    Lab Results   Component Value Date    LDLCALC 104 02/07/2018    (goal LDL reduction with dx if diabetes is

## 2018-11-07 NOTE — TELEPHONE ENCOUNTER
[facility] is not taking his insurance , order faxed to [address] [place] [address]

## 2018-11-08 ENCOUNTER — HOSPITAL ENCOUNTER (OUTPATIENT)
Dept: WOMENS IMAGING | Age: 73
Discharge: HOME | End: 2018-11-10
Payer: MEDICARE

## 2018-11-08 DIAGNOSIS — R93.7 ABNORMAL FINDINGS ON DIAGNOSTIC IMAGING OF OTHER PARTS OF MUSCULOSKELETAL SYSTEM: ICD-10-CM

## 2018-11-08 DIAGNOSIS — S22.41XA CLOSED FRACTURE OF MULTIPLE RIBS OF RIGHT SIDE, INITIAL ENCOUNTER: ICD-10-CM

## 2018-11-08 PROCEDURE — 77080 DXA BONE DENSITY AXIAL: CPT

## 2018-11-14 ENCOUNTER — OFFICE VISIT (OUTPATIENT)
Dept: FAMILY MEDICINE CLINIC | Age: 73
End: 2018-11-14
Payer: MEDICARE

## 2018-11-14 VITALS
OXYGEN SATURATION: 100 % | BODY MASS INDEX: 24.22 KG/M2 | DIASTOLIC BLOOD PRESSURE: 61 MMHG | HEIGHT: 70 IN | WEIGHT: 169.2 LBS | HEART RATE: 60 BPM | SYSTOLIC BLOOD PRESSURE: 106 MMHG

## 2018-11-14 DIAGNOSIS — M85.80 OSTEOPENIA, UNSPECIFIED LOCATION: ICD-10-CM

## 2018-11-14 DIAGNOSIS — R07.81 RIB PAIN: Primary | ICD-10-CM

## 2018-11-14 DIAGNOSIS — R00.1 BRADYCARDIA: ICD-10-CM

## 2018-11-14 PROCEDURE — 99214 OFFICE O/P EST MOD 30 MIN: CPT | Performed by: NURSE PRACTITIONER

## 2018-11-14 ASSESSMENT — ENCOUNTER SYMPTOMS
SHORTNESS OF BREATH: 0
EYE DISCHARGE: 0
COUGH: 0
BLOOD IN STOOL: 0
ABDOMINAL PAIN: 0

## 2018-11-14 NOTE — PROGRESS NOTES
qd drinking       Current Outpatient Prescriptions   Medication Sig Dispense Refill    ibuprofen (ADVIL;MOTRIN) 800 MG tablet Take 1 tablet by mouth every 6 hours as needed for Pain 120 tablet 0    esomeprazole Magnesium (NEXIUM) 20 MG PACK Take 20 mg by mouth daily      sildenafil (VIAGRA) 25 MG tablet Take 25 mg by mouth as needed for Erectile Dysfunction      ferrous sulfate 325 (65 Fe) MG tablet Take 325 mg by mouth daily (with breakfast)      magnesium oxide (MAG-OX) 400 MG tablet Take 400 mg by mouth daily      vitamin C (ASCORBIC ACID) 500 MG tablet Take 500 mg by mouth 2 times daily      aspirin 81 MG tablet Take 81 mg by mouth daily      Lactobacillus-Inulin (PROBIOTIC DIGESTIVE SUPPORT) CAPS Take 200 mg by mouth 2 times daily 60 capsule 5    vitamin B-12 (CYANOCOBALAMIN) 500 MCG tablet Take 1 tablet by mouth 2 times daily 30 tablet 3    vitamin B-6 (PYRIDOXINE) 50 MG tablet Take 1 tablet by mouth daily 30 tablet 3    folic acid (FOLVITE) 330 MCG tablet Take 1 tablet by mouth daily 30 tablet 3    L-Arginine 1000 MG TABS Take 1,800 mg by mouth 2 times daily 30 tablet 0    Multiple Vitamin (MULTI-DAY VITAMINS) TABS Take 1 Dose by mouth daily 30 tablet 0    Coenzyme Q10 (COQ10) 100 MG CAPS Take 100 mg by mouth daily 30 capsule 0    Turmeric (CURCUMIN 95) 500 MG CAPS Take 500 mg by mouth daily 30 capsule 0     No current facility-administered medications for this visit. No Known Allergies      Subjective:      Review of Systems   Constitutional: Negative for activity change, chills, fatigue and fever. Eyes: Negative for discharge and visual disturbance. Respiratory: Negative for cough and shortness of breath. Cardiovascular: Negative for chest pain and leg swelling. Gastrointestinal: Negative for abdominal pain and blood in stool. Endocrine: Negative for cold intolerance and heat intolerance. Genitourinary: Negative for dysuria and flank pain.    Musculoskeletal: Negative for joint swelling and myalgias. Skin: Negative for pallor and rash. Neurological: Negative for dizziness and headaches. Psychiatric/Behavioral: Negative for hallucinations and suicidal ideas. Objective:     Physical Exam   Constitutional: He is oriented to person, place, and time. He appears well-developed and well-nourished. /61   Pulse 50   Ht 5' 9.5\" (1.765 m)   Wt 169 lb 3.2 oz (76.7 kg)   SpO2 100% Comment: resting @ RA  BMI 24.63 kg/m²      HENT:   Head: Normocephalic and atraumatic. Eyes: Conjunctivae and EOM are normal. No scleral icterus. Neck: Neck supple. Cardiovascular: Normal rate, regular rhythm, normal heart sounds and intact distal pulses. Pulmonary/Chest: Effort normal and breath sounds normal. He has no wheezes. He has no rales. Abdominal: Soft. Bowel sounds are normal.   Musculoskeletal: Normal range of motion. He exhibits no edema. Arms:  Neurological: He is alert and oriented to person, place, and time. Skin: Skin is warm and dry. Psychiatric: He has a normal mood and affect. Nursing note and vitals reviewed. /61   Pulse 60   Ht 5' 9.5\" (1.765 m)   Wt 169 lb 3.2 oz (76.7 kg)   SpO2 100% Comment: resting @ RA  BMI 24.63 kg/m²     CBC:   Lab Results   Component Value Date    WBC 6.3 02/27/2018    RBC 4.73 02/27/2018    HGB 14.3 02/27/2018    HCT 43.2 02/27/2018    MCV 91.5 02/27/2018    MCH 30.2 02/27/2018    MCHC 33.0 02/27/2018    RDW 14.0 02/27/2018     02/27/2018    MPV 8.6 02/27/2018     CMP:    Lab Results   Component Value Date     02/07/2018    K 4.9 05/08/2018     02/07/2018    CO2 28 02/07/2018    BUN 23 02/07/2018    CREATININE 0.80 02/07/2018    GLUCOSE 84 02/07/2018    LABALBU 3.9 02/07/2018    CALCIUM 9.8 02/07/2018    BILITOT 0.7 02/07/2018    ALKPHOS 64 02/07/2018    AST 24 02/07/2018    ALT 28 02/07/2018     No results found for: LABA1C        Assessment:       Diagnosis Orders   1. Rib pain     2.

## 2018-12-06 ENCOUNTER — HOSPITAL ENCOUNTER (EMERGENCY)
Age: 73
Discharge: HOME | End: 2018-12-06
Attending: EMERGENCY MEDICINE
Payer: MEDICARE

## 2018-12-06 VITALS
OXYGEN SATURATION: 100 % | TEMPERATURE: 97.8 F | SYSTOLIC BLOOD PRESSURE: 145 MMHG | DIASTOLIC BLOOD PRESSURE: 55 MMHG | RESPIRATION RATE: 16 BRPM | HEIGHT: 70 IN | BODY MASS INDEX: 23.62 KG/M2 | WEIGHT: 165 LBS | HEART RATE: 49 BPM

## 2018-12-06 DIAGNOSIS — T16.2XXA FOREIGN BODY OF LEFT EAR, INITIAL ENCOUNTER: Primary | ICD-10-CM

## 2018-12-06 PROCEDURE — 99282 EMERGENCY DEPT VISIT SF MDM: CPT

## 2018-12-06 ASSESSMENT — PAIN SCALES - GENERAL: PAINLEVEL_OUTOF10: 3

## 2018-12-06 ASSESSMENT — PAIN DESCRIPTION - LOCATION: LOCATION: EAR

## 2018-12-06 ASSESSMENT — PAIN DESCRIPTION - ORIENTATION: ORIENTATION: LEFT

## 2018-12-06 ASSESSMENT — PAIN DESCRIPTION - PAIN TYPE: TYPE: ACUTE PAIN

## 2018-12-06 ASSESSMENT — ENCOUNTER SYMPTOMS: VOMITING: 0

## 2018-12-06 NOTE — ED PROVIDER NOTES
Constitutional: He is oriented to person, place, and time. He appears well-developed and well-nourished. HENT:   Head: Normocephalic and atraumatic. Left Ear: External ear normal. A foreign body (plastic piece on left ear) is present. Cardiovascular: Normal rate, regular rhythm and normal heart sounds. Pulmonary/Chest: Effort normal and breath sounds normal.   Neurological: He is alert and oriented to person, place, and time. Nursing note and vitals reviewed. MEDICAL DECISION MAKING:         DIAGNOSTIC RESULTS     EKG: All EKG's are interpreted by the Emergency Department Physician who either signs or Co-signs this chart in the absence of acardiologist.        RADIOLOGY:Allplain film, CT, MRI, and formal ultrasound images (except ED bedside ultrasound) are read by the radiologist and the images and interpretations are directly viewed by the emergency physician. LABS:All lab results were reviewed by myself, and all abnormals are listed below. Labs Reviewed - No data to display      EMERGENCY DEPARTMENT COURSE:   Vitals:    Vitals:    12/06/18 1748   BP: (!) 145/55   Pulse: (!) 49   Resp: 16   Temp: 97.8 °F (36.6 °C)   TempSrc: Oral   SpO2: 100%   Weight: 165 lb (74.8 kg)   Height: 5' 10\" (1.778 m)       The patient was given the following medications while in the emergency department:  No orders of the defined types were placed in this encounter. -------------------------      CRITICAL CARE:     CONSULTS:  None    PROCEDURES:  Foreign Body  Date/Time: 12/6/2018 6:35 PM  Performed by: Kaleb Winston  Authorized by: Curtis Koyanagi     Consent:     Consent obtained:  Verbal    Consent given by:  Patient    Risks discussed:  Infection and pain  Location:     Location:  Ear    Ear location:  L ear  Pre-procedure details:     Imaging:  None    Neurovascular status: intact    Anesthesia (see MAR for exact dosages):      Anesthesia method:  None  Procedure type:     Procedure complexity:

## 2018-12-07 ENCOUNTER — TELEPHONE (OUTPATIENT)
Dept: FAMILY MEDICINE CLINIC | Age: 73
End: 2018-12-07

## 2018-12-07 NOTE — TELEPHONE ENCOUNTER
[facility] ([facility]) ED Follow up Call    Reason for ED visit:  Foreign object in ear     FU appts/Provider:    Future Appointments  Date Time Provider Burton Rapp   1/3/2019 9:00 AM Yaa Marin MD [address]   1/8/2019 1:00 PM Connie Childs MD [id number]   3/6/2019 1:15 PM Hillary Rodriguez APRN - CNP fp sc [address] IF NOT USED  Hi, this message is for  Peyman Anamaria  This is Cosmo Self from [facility] office. Just calling to see how you are doing after your recent visit to the Emergency Room. [facility] wants to make sure you were able to fill any prescriptions and that you understand your discharge instructions. Please return our call if you need to make a follow up appointment with your provider or have any further needs. Our phone number is [phone]. Have a great day.

## 2019-01-03 ENCOUNTER — OFFICE VISIT (OUTPATIENT)
Dept: INTERNAL MEDICINE CLINIC | Age: 74
End: 2019-01-03
Payer: MEDICARE

## 2019-01-03 VITALS
BODY MASS INDEX: 24.48 KG/M2 | SYSTOLIC BLOOD PRESSURE: 124 MMHG | WEIGHT: 171 LBS | HEIGHT: 70 IN | DIASTOLIC BLOOD PRESSURE: 68 MMHG

## 2019-01-03 DIAGNOSIS — M25.511 CHRONIC RIGHT SHOULDER PAIN: ICD-10-CM

## 2019-01-03 DIAGNOSIS — G89.29 CHRONIC RIGHT SHOULDER PAIN: ICD-10-CM

## 2019-01-03 DIAGNOSIS — M85.859 OSTEOPENIA OF HIP, UNSPECIFIED LATERALITY: ICD-10-CM

## 2019-01-03 DIAGNOSIS — M77.12 LATERAL EPICONDYLITIS OF LEFT ELBOW: ICD-10-CM

## 2019-01-03 DIAGNOSIS — K63.5 POLYP OF COLON, UNSPECIFIED PART OF COLON, UNSPECIFIED TYPE: ICD-10-CM

## 2019-01-03 DIAGNOSIS — N52.9 ERECTILE DYSFUNCTION, UNSPECIFIED ERECTILE DYSFUNCTION TYPE: Primary | ICD-10-CM

## 2019-01-03 DIAGNOSIS — Z00.00 HEALTH CARE MAINTENANCE: ICD-10-CM

## 2019-01-03 DIAGNOSIS — Z85.46 HISTORY OF PROSTATE CANCER: ICD-10-CM

## 2019-01-03 DIAGNOSIS — M16.11 OSTEOARTHRITIS OF RIGHT HIP, UNSPECIFIED OSTEOARTHRITIS TYPE: ICD-10-CM

## 2019-01-03 PROCEDURE — 1123F ACP DISCUSS/DSCN MKR DOCD: CPT | Performed by: INTERNAL MEDICINE

## 2019-01-03 PROCEDURE — 99204 OFFICE O/P NEW MOD 45 MIN: CPT | Performed by: INTERNAL MEDICINE

## 2019-01-03 PROCEDURE — 1101F PT FALLS ASSESS-DOCD LE1/YR: CPT | Performed by: INTERNAL MEDICINE

## 2019-01-03 PROCEDURE — 4040F PNEUMOC VAC/ADMIN/RCVD: CPT | Performed by: INTERNAL MEDICINE

## 2019-01-03 PROCEDURE — 3017F COLORECTAL CA SCREEN DOC REV: CPT | Performed by: INTERNAL MEDICINE

## 2019-01-03 PROCEDURE — G8427 DOCREV CUR MEDS BY ELIG CLIN: HCPCS | Performed by: INTERNAL MEDICINE

## 2019-01-03 PROCEDURE — G8420 CALC BMI NORM PARAMETERS: HCPCS | Performed by: INTERNAL MEDICINE

## 2019-01-03 PROCEDURE — G8482 FLU IMMUNIZE ORDER/ADMIN: HCPCS | Performed by: INTERNAL MEDICINE

## 2019-01-03 PROCEDURE — 1036F TOBACCO NON-USER: CPT | Performed by: INTERNAL MEDICINE

## 2019-01-03 RX ORDER — NAPROXEN 500 MG/1
500 TABLET ORAL 2 TIMES DAILY WITH MEALS
Qty: 20 TABLET | Refills: 0 | Status: SHIPPED | OUTPATIENT
Start: 2019-01-03 | End: 2019-02-05 | Stop reason: ALTCHOICE

## 2019-01-03 ASSESSMENT — PATIENT HEALTH QUESTIONNAIRE - PHQ9
SUM OF ALL RESPONSES TO PHQ QUESTIONS 1-9: 0
1. LITTLE INTEREST OR PLEASURE IN DOING THINGS: 0
SUM OF ALL RESPONSES TO PHQ9 QUESTIONS 1 & 2: 0
2. FEELING DOWN, DEPRESSED OR HOPELESS: 0
SUM OF ALL RESPONSES TO PHQ QUESTIONS 1-9: 0

## 2019-01-03 ASSESSMENT — ENCOUNTER SYMPTOMS
ABDOMINAL PAIN: 0
BACK PAIN: 0
FACIAL SWELLING: 0
SHORTNESS OF BREATH: 0
CONSTIPATION: 0
COUGH: 0
DIARRHEA: 0
CHEST TIGHTNESS: 0
ABDOMINAL DISTENTION: 0
APNEA: 0
COLOR CHANGE: 0
WHEEZING: 0

## 2019-01-08 ENCOUNTER — OFFICE VISIT (OUTPATIENT)
Dept: ORTHOPEDIC SURGERY | Age: 74
End: 2019-01-08
Payer: MEDICARE

## 2019-01-08 VITALS — HEIGHT: 70 IN | BODY MASS INDEX: 24.34 KG/M2 | WEIGHT: 170 LBS

## 2019-01-08 DIAGNOSIS — G89.29 CHRONIC LEFT HIP PAIN: Primary | ICD-10-CM

## 2019-01-08 DIAGNOSIS — M25.552 CHRONIC LEFT HIP PAIN: Primary | ICD-10-CM

## 2019-01-08 PROCEDURE — 99212 OFFICE O/P EST SF 10 MIN: CPT | Performed by: ORTHOPAEDIC SURGERY

## 2019-01-08 PROCEDURE — 4040F PNEUMOC VAC/ADMIN/RCVD: CPT | Performed by: ORTHOPAEDIC SURGERY

## 2019-01-08 PROCEDURE — G8427 DOCREV CUR MEDS BY ELIG CLIN: HCPCS | Performed by: ORTHOPAEDIC SURGERY

## 2019-01-08 PROCEDURE — 3017F COLORECTAL CA SCREEN DOC REV: CPT | Performed by: ORTHOPAEDIC SURGERY

## 2019-01-08 PROCEDURE — G8420 CALC BMI NORM PARAMETERS: HCPCS | Performed by: ORTHOPAEDIC SURGERY

## 2019-01-08 PROCEDURE — 1036F TOBACCO NON-USER: CPT | Performed by: ORTHOPAEDIC SURGERY

## 2019-01-08 PROCEDURE — 1101F PT FALLS ASSESS-DOCD LE1/YR: CPT | Performed by: ORTHOPAEDIC SURGERY

## 2019-01-08 PROCEDURE — G8482 FLU IMMUNIZE ORDER/ADMIN: HCPCS | Performed by: ORTHOPAEDIC SURGERY

## 2019-01-08 PROCEDURE — 1123F ACP DISCUSS/DSCN MKR DOCD: CPT | Performed by: ORTHOPAEDIC SURGERY

## 2019-01-09 ENCOUNTER — TELEPHONE (OUTPATIENT)
Dept: FAMILY MEDICINE CLINIC | Age: 74
End: 2019-01-09

## 2019-01-30 ENCOUNTER — HOSPITAL ENCOUNTER (OUTPATIENT)
Age: 74
Discharge: HOME | End: 2019-01-30
Payer: MEDICARE

## 2019-01-30 DIAGNOSIS — Z00.00 HEALTH CARE MAINTENANCE: ICD-10-CM

## 2019-01-30 DIAGNOSIS — C61 PROSTATE CANCER (HCC): ICD-10-CM

## 2019-01-30 LAB
ALBUMIN SERPL-MCNC: 4.4 G/DL (ref 3.5–5.2)
ALBUMIN/GLOBULIN RATIO: ABNORMAL (ref 1–2.5)
ALP BLD-CCNC: 76 U/L (ref 40–129)
ALT SERPL-CCNC: 18 U/L (ref 5–41)
ANION GAP SERPL CALCULATED.3IONS-SCNC: 11 MMOL/L (ref 9–17)
AST SERPL-CCNC: 20 U/L
BILIRUB SERPL-MCNC: 0.37 MG/DL (ref 0.3–1.2)
BUN BLDV-MCNC: 34 MG/DL (ref 8–23)
BUN/CREAT BLD: ABNORMAL (ref 9–20)
CALCIUM SERPL-MCNC: 9.7 MG/DL (ref 8.6–10.4)
CHLORIDE BLD-SCNC: 104 MMOL/L (ref 98–107)
CHOLESTEROL/HDL RATIO: 2.8
CHOLESTEROL: 167 MG/DL
CO2: 28 MMOL/L (ref 20–31)
CREAT SERPL-MCNC: 0.98 MG/DL (ref 0.7–1.2)
GFR AFRICAN AMERICAN: >60 ML/MIN
GFR NON-AFRICAN AMERICAN: >60 ML/MIN
GFR SERPL CREATININE-BSD FRML MDRD: ABNORMAL ML/MIN/{1.73_M2}
GFR SERPL CREATININE-BSD FRML MDRD: ABNORMAL ML/MIN/{1.73_M2}
GLUCOSE BLD-MCNC: 98 MG/DL (ref 70–99)
HCT VFR BLD CALC: 41.2 % (ref 41–53)
HDLC SERPL-MCNC: 60 MG/DL
HEMOGLOBIN: 13.6 G/DL (ref 13.5–17.5)
LDL CHOLESTEROL: 99 MG/DL (ref 0–130)
MCH RBC QN AUTO: 30.5 PG (ref 26–34)
MCHC RBC AUTO-ENTMCNC: 33 G/DL (ref 31–37)
MCV RBC AUTO: 92.3 FL (ref 80–100)
NRBC AUTOMATED: ABNORMAL PER 100 WBC
PDW BLD-RTO: 13.4 % (ref 11.5–14.9)
PLATELET # BLD: 243 K/UL (ref 150–450)
PMV BLD AUTO: 8.3 FL (ref 6–12)
POTASSIUM SERPL-SCNC: 4.5 MMOL/L (ref 3.7–5.3)
PROSTATE SPECIFIC ANTIGEN: 0.43 UG/L
RBC # BLD: 4.46 M/UL (ref 4.5–5.9)
SODIUM BLD-SCNC: 143 MMOL/L (ref 135–144)
TOTAL PROTEIN: 7.2 G/DL (ref 6.4–8.3)
TRIGL SERPL-MCNC: 38 MG/DL
VLDLC SERPL CALC-MCNC: NORMAL MG/DL (ref 1–30)
WBC # BLD: 7.6 K/UL (ref 3.5–11)

## 2019-01-30 PROCEDURE — 85027 COMPLETE CBC AUTOMATED: CPT

## 2019-01-30 PROCEDURE — 80053 COMPREHEN METABOLIC PANEL: CPT

## 2019-01-30 PROCEDURE — 80061 LIPID PANEL: CPT

## 2019-01-30 PROCEDURE — 36415 COLL VENOUS BLD VENIPUNCTURE: CPT

## 2019-01-30 PROCEDURE — 84153 ASSAY OF PSA TOTAL: CPT

## 2019-02-05 ENCOUNTER — OFFICE VISIT (OUTPATIENT)
Dept: UROLOGY | Age: 74
End: 2019-02-05
Payer: MEDICARE

## 2019-02-05 ENCOUNTER — OFFICE VISIT (OUTPATIENT)
Dept: ORTHOPEDIC SURGERY | Age: 74
End: 2019-02-05
Payer: MEDICARE

## 2019-02-05 VITALS
SYSTOLIC BLOOD PRESSURE: 127 MMHG | HEIGHT: 70 IN | DIASTOLIC BLOOD PRESSURE: 65 MMHG | TEMPERATURE: 97.5 F | BODY MASS INDEX: 25 KG/M2 | WEIGHT: 174.6 LBS | HEART RATE: 63 BPM

## 2019-02-05 DIAGNOSIS — M54.5 ACUTE LOW BACK PAIN, UNSPECIFIED BACK PAIN LATERALITY, WITH SCIATICA PRESENCE UNSPECIFIED: Primary | ICD-10-CM

## 2019-02-05 DIAGNOSIS — N52.35 ERECTILE DYSFUNCTION FOLLOWING RADIATION THERAPY: ICD-10-CM

## 2019-02-05 DIAGNOSIS — M51.36 DDD (DEGENERATIVE DISC DISEASE), LUMBAR: ICD-10-CM

## 2019-02-05 DIAGNOSIS — C61 PROSTATE CANCER (HCC): Primary | ICD-10-CM

## 2019-02-05 DIAGNOSIS — M43.16 SPONDYLOLISTHESIS OF LUMBAR REGION: ICD-10-CM

## 2019-02-05 PROBLEM — M54.50 ACUTE LOW BACK PAIN: Status: ACTIVE | Noted: 2019-02-05

## 2019-02-05 PROBLEM — M51.369 DDD (DEGENERATIVE DISC DISEASE), LUMBAR: Status: ACTIVE | Noted: 2019-02-05

## 2019-02-05 PROCEDURE — G8482 FLU IMMUNIZE ORDER/ADMIN: HCPCS | Performed by: ORTHOPAEDIC SURGERY

## 2019-02-05 PROCEDURE — 1101F PT FALLS ASSESS-DOCD LE1/YR: CPT | Performed by: ORTHOPAEDIC SURGERY

## 2019-02-05 PROCEDURE — 1101F PT FALLS ASSESS-DOCD LE1/YR: CPT | Performed by: UROLOGY

## 2019-02-05 PROCEDURE — G8419 CALC BMI OUT NRM PARAM NOF/U: HCPCS | Performed by: UROLOGY

## 2019-02-05 PROCEDURE — 4040F PNEUMOC VAC/ADMIN/RCVD: CPT | Performed by: ORTHOPAEDIC SURGERY

## 2019-02-05 PROCEDURE — 99213 OFFICE O/P EST LOW 20 MIN: CPT | Performed by: UROLOGY

## 2019-02-05 PROCEDURE — 3017F COLORECTAL CA SCREEN DOC REV: CPT | Performed by: ORTHOPAEDIC SURGERY

## 2019-02-05 PROCEDURE — 99213 OFFICE O/P EST LOW 20 MIN: CPT | Performed by: ORTHOPAEDIC SURGERY

## 2019-02-05 PROCEDURE — 3017F COLORECTAL CA SCREEN DOC REV: CPT | Performed by: UROLOGY

## 2019-02-05 PROCEDURE — G8427 DOCREV CUR MEDS BY ELIG CLIN: HCPCS | Performed by: UROLOGY

## 2019-02-05 PROCEDURE — G8482 FLU IMMUNIZE ORDER/ADMIN: HCPCS | Performed by: UROLOGY

## 2019-02-05 PROCEDURE — 1036F TOBACCO NON-USER: CPT | Performed by: UROLOGY

## 2019-02-05 PROCEDURE — G8419 CALC BMI OUT NRM PARAM NOF/U: HCPCS | Performed by: ORTHOPAEDIC SURGERY

## 2019-02-05 PROCEDURE — 4040F PNEUMOC VAC/ADMIN/RCVD: CPT | Performed by: UROLOGY

## 2019-02-05 PROCEDURE — 1123F ACP DISCUSS/DSCN MKR DOCD: CPT | Performed by: ORTHOPAEDIC SURGERY

## 2019-02-05 PROCEDURE — 1036F TOBACCO NON-USER: CPT | Performed by: ORTHOPAEDIC SURGERY

## 2019-02-05 PROCEDURE — G8427 DOCREV CUR MEDS BY ELIG CLIN: HCPCS | Performed by: ORTHOPAEDIC SURGERY

## 2019-02-05 PROCEDURE — 1123F ACP DISCUSS/DSCN MKR DOCD: CPT | Performed by: UROLOGY

## 2019-02-05 ASSESSMENT — ENCOUNTER SYMPTOMS
VOMITING: 0
COLOR CHANGE: 0
COUGH: 0
EYE PAIN: 0
NAUSEA: 0
BACK PAIN: 1
WHEEZING: 0
ABDOMINAL PAIN: 0
BACK PAIN: 0
SHORTNESS OF BREATH: 0
EYE REDNESS: 0

## 2019-02-18 ENCOUNTER — HOSPITAL ENCOUNTER (OUTPATIENT)
Dept: PAIN MANAGEMENT | Age: 74
Discharge: HOME | End: 2019-02-18
Payer: MEDICARE

## 2019-02-18 VITALS
RESPIRATION RATE: 16 BRPM | HEART RATE: 54 BPM | WEIGHT: 174 LBS | BODY MASS INDEX: 24.91 KG/M2 | HEIGHT: 70 IN | OXYGEN SATURATION: 100 % | TEMPERATURE: 98.5 F | SYSTOLIC BLOOD PRESSURE: 129 MMHG | DIASTOLIC BLOOD PRESSURE: 48 MMHG

## 2019-02-18 DIAGNOSIS — M43.16 SPONDYLOLISTHESIS OF LUMBAR REGION: ICD-10-CM

## 2019-02-18 DIAGNOSIS — M47.817 LUMBOSACRAL SPONDYLOSIS WITHOUT MYELOPATHY: ICD-10-CM

## 2019-02-18 DIAGNOSIS — M54.16 LUMBAR RADICULOPATHY: Primary | ICD-10-CM

## 2019-02-18 DIAGNOSIS — M51.36 DDD (DEGENERATIVE DISC DISEASE), LUMBAR: ICD-10-CM

## 2019-02-18 PROCEDURE — 99203 OFFICE O/P NEW LOW 30 MIN: CPT

## 2019-02-18 PROCEDURE — 99204 OFFICE O/P NEW MOD 45 MIN: CPT | Performed by: PAIN MEDICINE

## 2019-02-18 ASSESSMENT — PAIN SCALES - GENERAL: PAINLEVEL_OUTOF10: 3

## 2019-02-18 ASSESSMENT — ENCOUNTER SYMPTOMS
NAUSEA: 0
BACK PAIN: 1
CONSTIPATION: 0
ABDOMINAL PAIN: 0
ALLERGIC/IMMUNOLOGIC NEGATIVE: 1
VOMITING: 0
EYE DISCHARGE: 0
RESPIRATORY NEGATIVE: 1
WHEEZING: 0
SHORTNESS OF BREATH: 0
PHOTOPHOBIA: 0
GASTROINTESTINAL NEGATIVE: 1
EYES NEGATIVE: 1

## 2019-02-18 ASSESSMENT — PAIN DESCRIPTION - DESCRIPTORS: DESCRIPTORS: CONSTANT;BURNING;SHARP

## 2019-02-18 ASSESSMENT — PAIN DESCRIPTION - DIRECTION: RADIATING_TOWARDS: DOWN RIGHT LEG

## 2019-02-18 ASSESSMENT — PAIN DESCRIPTION - LOCATION: LOCATION: BACK;HIP;LEG

## 2019-02-18 ASSESSMENT — PAIN DESCRIPTION - ONSET: ONSET: ON-GOING

## 2019-02-18 ASSESSMENT — PAIN DESCRIPTION - ORIENTATION: ORIENTATION: RIGHT

## 2019-02-18 ASSESSMENT — PAIN DESCRIPTION - PROGRESSION: CLINICAL_PROGRESSION: GRADUALLY WORSENING

## 2019-02-18 ASSESSMENT — PAIN DESCRIPTION - FREQUENCY: FREQUENCY: CONTINUOUS

## 2019-02-18 ASSESSMENT — PAIN DESCRIPTION - PAIN TYPE: TYPE: CHRONIC PAIN

## 2019-03-05 ENCOUNTER — HOSPITAL ENCOUNTER (OUTPATIENT)
Dept: GENERAL RADIOLOGY | Age: 74
Discharge: HOME | End: 2019-03-07
Payer: MEDICARE

## 2019-03-05 ENCOUNTER — HOSPITAL ENCOUNTER (OUTPATIENT)
Dept: PAIN MANAGEMENT | Age: 74
Discharge: HOME | End: 2019-03-05
Payer: MEDICARE

## 2019-03-05 VITALS
HEART RATE: 40 BPM | DIASTOLIC BLOOD PRESSURE: 77 MMHG | SYSTOLIC BLOOD PRESSURE: 137 MMHG | HEIGHT: 70 IN | WEIGHT: 174 LBS | BODY MASS INDEX: 24.91 KG/M2 | OXYGEN SATURATION: 100 % | RESPIRATION RATE: 16 BRPM | TEMPERATURE: 97.6 F

## 2019-03-05 DIAGNOSIS — M54.16 LUMBAR RADICULOPATHY: ICD-10-CM

## 2019-03-05 DIAGNOSIS — M51.36 DDD (DEGENERATIVE DISC DISEASE), LUMBAR: ICD-10-CM

## 2019-03-05 DIAGNOSIS — M43.16 SPONDYLOLISTHESIS OF LUMBAR REGION: ICD-10-CM

## 2019-03-05 DIAGNOSIS — M54.16 LUMBAR RADICULOPATHY: Primary | ICD-10-CM

## 2019-03-05 DIAGNOSIS — M47.817 LUMBOSACRAL SPONDYLOSIS WITHOUT MYELOPATHY: ICD-10-CM

## 2019-03-05 PROCEDURE — 3209999900 FLUORO FOR SURGICAL PROCEDURES

## 2019-03-05 PROCEDURE — 62323 NJX INTERLAMINAR LMBR/SAC: CPT | Performed by: PAIN MEDICINE

## 2019-03-05 PROCEDURE — 62327 NJX INTERLAMINAR LMBR/SAC: CPT

## 2019-03-05 ASSESSMENT — PAIN DESCRIPTION - DIRECTION: RADIATING_TOWARDS: RIGHT LEG

## 2019-03-05 ASSESSMENT — PAIN DESCRIPTION - PAIN TYPE: TYPE: CHRONIC PAIN

## 2019-03-05 ASSESSMENT — PAIN DESCRIPTION - FREQUENCY: FREQUENCY: CONTINUOUS

## 2019-03-05 ASSESSMENT — PAIN - FUNCTIONAL ASSESSMENT
PAIN_FUNCTIONAL_ASSESSMENT: 0-10
PAIN_FUNCTIONAL_ASSESSMENT: ACTIVITIES ARE NOT PREVENTED

## 2019-03-05 ASSESSMENT — PAIN DESCRIPTION - PROGRESSION: CLINICAL_PROGRESSION: GRADUALLY WORSENING

## 2019-03-05 ASSESSMENT — PAIN DESCRIPTION - ONSET: ONSET: ON-GOING

## 2019-03-05 ASSESSMENT — PAIN DESCRIPTION - DESCRIPTORS: DESCRIPTORS: BURNING;SHARP

## 2019-03-05 ASSESSMENT — PAIN SCALES - GENERAL: PAINLEVEL_OUTOF10: 4

## 2019-03-05 ASSESSMENT — PAIN DESCRIPTION - ORIENTATION: ORIENTATION: LOWER

## 2019-03-05 ASSESSMENT — PAIN DESCRIPTION - LOCATION: LOCATION: BACK

## 2019-03-06 ENCOUNTER — TELEPHONE (OUTPATIENT)
Dept: PAIN MANAGEMENT | Age: 74
End: 2019-03-06

## 2019-03-19 ENCOUNTER — OFFICE VISIT (OUTPATIENT)
Dept: INTERNAL MEDICINE CLINIC | Age: 74
End: 2019-03-19
Payer: MEDICARE

## 2019-03-19 VITALS
SYSTOLIC BLOOD PRESSURE: 122 MMHG | OXYGEN SATURATION: 99 % | HEART RATE: 49 BPM | WEIGHT: 172 LBS | BODY MASS INDEX: 24.62 KG/M2 | DIASTOLIC BLOOD PRESSURE: 76 MMHG | HEIGHT: 70 IN

## 2019-03-19 DIAGNOSIS — K63.5 POLYP OF COLON, UNSPECIFIED PART OF COLON, UNSPECIFIED TYPE: ICD-10-CM

## 2019-03-19 DIAGNOSIS — M77.12 LATERAL EPICONDYLITIS OF LEFT ELBOW: ICD-10-CM

## 2019-03-19 DIAGNOSIS — Z85.46 HISTORY OF PROSTATE CANCER: Primary | ICD-10-CM

## 2019-03-19 PROCEDURE — 99213 OFFICE O/P EST LOW 20 MIN: CPT | Performed by: INTERNAL MEDICINE

## 2019-03-19 PROCEDURE — 1123F ACP DISCUSS/DSCN MKR DOCD: CPT | Performed by: INTERNAL MEDICINE

## 2019-03-19 PROCEDURE — G8420 CALC BMI NORM PARAMETERS: HCPCS | Performed by: INTERNAL MEDICINE

## 2019-03-19 PROCEDURE — 3017F COLORECTAL CA SCREEN DOC REV: CPT | Performed by: INTERNAL MEDICINE

## 2019-03-19 PROCEDURE — 4040F PNEUMOC VAC/ADMIN/RCVD: CPT | Performed by: INTERNAL MEDICINE

## 2019-03-19 PROCEDURE — 1101F PT FALLS ASSESS-DOCD LE1/YR: CPT | Performed by: INTERNAL MEDICINE

## 2019-03-19 PROCEDURE — G8482 FLU IMMUNIZE ORDER/ADMIN: HCPCS | Performed by: INTERNAL MEDICINE

## 2019-03-19 PROCEDURE — G8427 DOCREV CUR MEDS BY ELIG CLIN: HCPCS | Performed by: INTERNAL MEDICINE

## 2019-03-19 PROCEDURE — 1036F TOBACCO NON-USER: CPT | Performed by: INTERNAL MEDICINE

## 2019-03-19 ASSESSMENT — ENCOUNTER SYMPTOMS
COUGH: 0
BLOOD IN STOOL: 0
DIARRHEA: 0
CONSTIPATION: 0
EYE REDNESS: 0
BACK PAIN: 1
EYE DISCHARGE: 0
CHEST TIGHTNESS: 0
SHORTNESS OF BREATH: 0
CHOKING: 0
COLOR CHANGE: 0
ABDOMINAL PAIN: 0
APNEA: 0
EYE ITCHING: 0
ABDOMINAL DISTENTION: 0
EYE PAIN: 0

## 2019-03-20 ENCOUNTER — HOSPITAL ENCOUNTER (OUTPATIENT)
Dept: PAIN MANAGEMENT | Age: 74
Discharge: HOME | End: 2019-03-20
Payer: MEDICARE

## 2019-03-20 VITALS
OXYGEN SATURATION: 98 % | WEIGHT: 172 LBS | BODY MASS INDEX: 24.62 KG/M2 | DIASTOLIC BLOOD PRESSURE: 63 MMHG | RESPIRATION RATE: 16 BRPM | SYSTOLIC BLOOD PRESSURE: 127 MMHG | HEIGHT: 70 IN | TEMPERATURE: 97.3 F | HEART RATE: 52 BPM

## 2019-03-20 DIAGNOSIS — M43.16 SPONDYLOLISTHESIS OF LUMBAR REGION: ICD-10-CM

## 2019-03-20 DIAGNOSIS — M51.36 DDD (DEGENERATIVE DISC DISEASE), LUMBAR: ICD-10-CM

## 2019-03-20 DIAGNOSIS — M47.817 LUMBOSACRAL SPONDYLOSIS WITHOUT MYELOPATHY: ICD-10-CM

## 2019-03-20 DIAGNOSIS — M54.16 LUMBAR RADICULOPATHY: Primary | ICD-10-CM

## 2019-03-20 PROCEDURE — 99213 OFFICE O/P EST LOW 20 MIN: CPT

## 2019-03-20 PROCEDURE — 99213 OFFICE O/P EST LOW 20 MIN: CPT | Performed by: PAIN MEDICINE

## 2019-03-20 ASSESSMENT — PAIN DESCRIPTION - FREQUENCY: FREQUENCY: CONTINUOUS

## 2019-03-20 ASSESSMENT — ENCOUNTER SYMPTOMS
ABDOMINAL PAIN: 0
RESPIRATORY NEGATIVE: 1
NAUSEA: 0
BACK PAIN: 1
GASTROINTESTINAL NEGATIVE: 1
ALLERGIC/IMMUNOLOGIC NEGATIVE: 1
EYES NEGATIVE: 1
RECTAL PAIN: 0
PHOTOPHOBIA: 0
CHEST TIGHTNESS: 0
CONSTIPATION: 0
SHORTNESS OF BREATH: 0
VOMITING: 0

## 2019-03-20 ASSESSMENT — PAIN SCALES - GENERAL: PAINLEVEL_OUTOF10: 3

## 2019-03-20 ASSESSMENT — PAIN DESCRIPTION - PROGRESSION: CLINICAL_PROGRESSION: GRADUALLY IMPROVING

## 2019-03-20 ASSESSMENT — PAIN DESCRIPTION - ONSET: ONSET: ON-GOING

## 2019-03-20 ASSESSMENT — PAIN DESCRIPTION - DESCRIPTORS: DESCRIPTORS: BURNING;TINGLING

## 2019-03-20 ASSESSMENT — PAIN DESCRIPTION - ORIENTATION: ORIENTATION: RIGHT;LOWER

## 2019-03-20 ASSESSMENT — PAIN DESCRIPTION - DIRECTION: RADIATING_TOWARDS: RIGHT LEG

## 2019-03-20 ASSESSMENT — PAIN DESCRIPTION - PAIN TYPE: TYPE: CHRONIC PAIN

## 2019-03-20 ASSESSMENT — PAIN DESCRIPTION - LOCATION: LOCATION: BACK;LEG;HIP

## 2019-04-16 ENCOUNTER — OFFICE VISIT (OUTPATIENT)
Dept: ORTHOPEDIC SURGERY | Age: 74
End: 2019-04-16
Payer: MEDICARE

## 2019-04-16 DIAGNOSIS — M25.552 CHRONIC LEFT HIP PAIN: ICD-10-CM

## 2019-04-16 DIAGNOSIS — M54.5 ACUTE LOW BACK PAIN, UNSPECIFIED BACK PAIN LATERALITY, WITH SCIATICA PRESENCE UNSPECIFIED: Primary | ICD-10-CM

## 2019-04-16 DIAGNOSIS — M70.61 GREATER TROCHANTERIC BURSITIS OF RIGHT HIP: ICD-10-CM

## 2019-04-16 DIAGNOSIS — G89.29 CHRONIC LEFT HIP PAIN: ICD-10-CM

## 2019-04-16 DIAGNOSIS — M51.36 DDD (DEGENERATIVE DISC DISEASE), LUMBAR: ICD-10-CM

## 2019-04-16 DIAGNOSIS — M43.16 SPONDYLOLISTHESIS OF LUMBAR REGION: ICD-10-CM

## 2019-04-16 PROCEDURE — 3017F COLORECTAL CA SCREEN DOC REV: CPT | Performed by: ORTHOPAEDIC SURGERY

## 2019-04-16 PROCEDURE — 99213 OFFICE O/P EST LOW 20 MIN: CPT | Performed by: ORTHOPAEDIC SURGERY

## 2019-04-16 PROCEDURE — 1036F TOBACCO NON-USER: CPT | Performed by: ORTHOPAEDIC SURGERY

## 2019-04-16 PROCEDURE — 1123F ACP DISCUSS/DSCN MKR DOCD: CPT | Performed by: ORTHOPAEDIC SURGERY

## 2019-04-16 PROCEDURE — G8420 CALC BMI NORM PARAMETERS: HCPCS | Performed by: ORTHOPAEDIC SURGERY

## 2019-04-16 PROCEDURE — G8427 DOCREV CUR MEDS BY ELIG CLIN: HCPCS | Performed by: ORTHOPAEDIC SURGERY

## 2019-04-16 PROCEDURE — 4040F PNEUMOC VAC/ADMIN/RCVD: CPT | Performed by: ORTHOPAEDIC SURGERY

## 2019-04-16 NOTE — PROGRESS NOTES
Subjective:      Patient ID: Miles Amaya is a 68 y.o. male. HPI  To my previous clinic note    Patient is here follow-up right radicular leg pain. Patient had a single epidural steroid injection gave only 2 weeks relief of pain. Patient has been doing a home exercise program since he was seen by pain management 5 weeks ago. Review of Systems    Objective:   Physical Exam   Constitutional: He is oriented to person, place, and time. He appears well-developed and well-nourished. HENT:   Head: Normocephalic and atraumatic. Eyes: Conjunctivae and EOM are normal.   Neck: Normal range of motion. Pulmonary/Chest: Effort normal. No respiratory distress. Neurological: He is alert and oriented to person, place, and time. He has normal strength. No sensory deficit. Normal gait   Skin: Skin is warm and dry. Psychiatric: His behavior is normal. Thought content normal.   Nursing note and vitals reviewed. Assessment:      Encounter Diagnoses   Name Primary?     Acute low back pain, unspecified back pain laterality, with sciatica presence unspecified Yes    Spondylolisthesis of lumbar region     DDD (degenerative disc disease), lumbar     Chronic left hip pain     Greater trochanteric bursitis of right hip      Lumbar epidural steroid injection with relief for 2 weeks only    5 weeks home exercise program.      Plan:      MRI lumbar spine    Repeat lumbar epidural steroid injection ×1    Follow-up        Amor Urbina MD

## 2019-04-24 DIAGNOSIS — M25.551 HIP PAIN, RIGHT: Primary | ICD-10-CM

## 2019-04-25 ENCOUNTER — OFFICE VISIT (OUTPATIENT)
Dept: ORTHOPEDIC SURGERY | Age: 74
End: 2019-04-25

## 2019-04-25 DIAGNOSIS — M54.50 LOW BACK PAIN WITHOUT SCIATICA, UNSPECIFIED BACK PAIN LATERALITY, UNSPECIFIED CHRONICITY: Primary | ICD-10-CM

## 2019-04-25 PROCEDURE — 99024 POSTOP FOLLOW-UP VISIT: CPT | Performed by: ORTHOPAEDIC SURGERY

## 2019-04-25 NOTE — PROGRESS NOTES
HPI: Mr. Alfred Reis returns today for reevaluation of his right hip. Since her last visit he's been seen by Dr. Bunny Francois who sent him for an epidural injection. The patient states that he had the injection and it got rid of all of his pain but it only lasted 2 weeks. At this point he is scheduled for an MRI of his lumbar spine. He reports that he has pain in this hip especially when he power walks. He also reports some stiffness after a period of rest.  This is a second time now that he's gotten an injection to his lower back that resulted in complete resolution of his hip pain which would tend to reinforce the assertion that his pain is likely coming from lumbar spine pathology. I encouraged him to follow through on his MRI study and see what Dr. Bunny Francois has to say about this. My standpoint I'll have him follow-up as needed.

## 2019-04-29 ENCOUNTER — HOSPITAL ENCOUNTER (OUTPATIENT)
Dept: MRI IMAGING | Age: 74
Discharge: HOME | End: 2019-05-01
Payer: MEDICARE

## 2019-04-29 DIAGNOSIS — M43.16 SPONDYLOLISTHESIS OF LUMBAR REGION: ICD-10-CM

## 2019-04-29 DIAGNOSIS — M51.36 DDD (DEGENERATIVE DISC DISEASE), LUMBAR: ICD-10-CM

## 2019-04-29 DIAGNOSIS — M54.5 ACUTE LOW BACK PAIN, UNSPECIFIED BACK PAIN LATERALITY, WITH SCIATICA PRESENCE UNSPECIFIED: ICD-10-CM

## 2019-04-29 PROCEDURE — 72148 MRI LUMBAR SPINE W/O DYE: CPT

## 2019-04-30 ENCOUNTER — HOSPITAL ENCOUNTER (OUTPATIENT)
Dept: PAIN MANAGEMENT | Age: 74
Discharge: HOME | End: 2019-04-30
Payer: MEDICARE

## 2019-04-30 VITALS
OXYGEN SATURATION: 99 % | HEIGHT: 70 IN | RESPIRATION RATE: 16 BRPM | DIASTOLIC BLOOD PRESSURE: 58 MMHG | TEMPERATURE: 98.2 F | BODY MASS INDEX: 24.34 KG/M2 | HEART RATE: 55 BPM | SYSTOLIC BLOOD PRESSURE: 127 MMHG | WEIGHT: 170 LBS

## 2019-04-30 DIAGNOSIS — M43.16 SPONDYLOLISTHESIS OF LUMBAR REGION: ICD-10-CM

## 2019-04-30 DIAGNOSIS — M51.36 DDD (DEGENERATIVE DISC DISEASE), LUMBAR: ICD-10-CM

## 2019-04-30 DIAGNOSIS — M54.16 LUMBAR RADICULOPATHY: Primary | ICD-10-CM

## 2019-04-30 DIAGNOSIS — M47.817 LUMBOSACRAL SPONDYLOSIS WITHOUT MYELOPATHY: ICD-10-CM

## 2019-04-30 PROCEDURE — 99214 OFFICE O/P EST MOD 30 MIN: CPT | Performed by: PAIN MEDICINE

## 2019-04-30 PROCEDURE — 99213 OFFICE O/P EST LOW 20 MIN: CPT

## 2019-04-30 ASSESSMENT — ENCOUNTER SYMPTOMS
BOWEL INCONTINENCE: 0
EYE PAIN: 0
COUGH: 0
SHORTNESS OF BREATH: 0
CONSTIPATION: 0
ABDOMINAL PAIN: 0
BACK PAIN: 1
EYE ITCHING: 0
EYES NEGATIVE: 1
SORE THROAT: 0
NAUSEA: 0
APNEA: 0
COLOR CHANGE: 0

## 2019-04-30 ASSESSMENT — PAIN DESCRIPTION - ORIENTATION: ORIENTATION: LOWER;RIGHT

## 2019-04-30 ASSESSMENT — PAIN DESCRIPTION - ONSET: ONSET: ON-GOING

## 2019-04-30 ASSESSMENT — PAIN - FUNCTIONAL ASSESSMENT: PAIN_FUNCTIONAL_ASSESSMENT: PREVENTS OR INTERFERES SOME ACTIVE ACTIVITIES AND ADLS

## 2019-04-30 ASSESSMENT — PAIN SCALES - GENERAL: PAINLEVEL_OUTOF10: 6

## 2019-04-30 ASSESSMENT — PAIN DESCRIPTION - LOCATION: LOCATION: BACK

## 2019-04-30 ASSESSMENT — PAIN DESCRIPTION - PAIN TYPE: TYPE: CHRONIC PAIN

## 2019-04-30 ASSESSMENT — PAIN DESCRIPTION - DIRECTION: RADIATING_TOWARDS: RIGHT LEG

## 2019-04-30 ASSESSMENT — PAIN DESCRIPTION - FREQUENCY: FREQUENCY: CONTINUOUS

## 2019-04-30 ASSESSMENT — PAIN DESCRIPTION - DESCRIPTORS: DESCRIPTORS: BURNING;TINGLING

## 2019-04-30 ASSESSMENT — PAIN DESCRIPTION - PROGRESSION: CLINICAL_PROGRESSION: GRADUALLY WORSENING
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
## 2019-05-14 ENCOUNTER — HOSPITAL ENCOUNTER (OUTPATIENT)
Dept: GENERAL RADIOLOGY | Age: 74
Discharge: HOME | End: 2019-05-16
Payer: MEDICARE

## 2019-05-14 ENCOUNTER — HOSPITAL ENCOUNTER (OUTPATIENT)
Dept: PAIN MANAGEMENT | Age: 74
Discharge: HOME | End: 2019-05-14
Payer: MEDICARE

## 2019-05-14 VITALS
HEIGHT: 70 IN | WEIGHT: 172 LBS | SYSTOLIC BLOOD PRESSURE: 133 MMHG | TEMPERATURE: 98.1 F | DIASTOLIC BLOOD PRESSURE: 71 MMHG | HEART RATE: 53 BPM | OXYGEN SATURATION: 99 % | BODY MASS INDEX: 24.62 KG/M2 | RESPIRATION RATE: 16 BRPM

## 2019-05-14 DIAGNOSIS — M54.16 LUMBAR RADICULOPATHY: ICD-10-CM

## 2019-05-14 DIAGNOSIS — M51.36 DDD (DEGENERATIVE DISC DISEASE), LUMBAR: ICD-10-CM

## 2019-05-14 DIAGNOSIS — M54.16 LUMBAR RADICULOPATHY: Primary | ICD-10-CM

## 2019-05-14 DIAGNOSIS — M47.817 LUMBOSACRAL SPONDYLOSIS WITHOUT MYELOPATHY: ICD-10-CM

## 2019-05-14 DIAGNOSIS — M43.16 SPONDYLOLISTHESIS OF LUMBAR REGION: ICD-10-CM

## 2019-05-14 PROCEDURE — 6360000002 HC RX W HCPCS: Performed by: PAIN MEDICINE

## 2019-05-14 PROCEDURE — 62323 NJX INTERLAMINAR LMBR/SAC: CPT | Performed by: PAIN MEDICINE

## 2019-05-14 PROCEDURE — 3209999900 FLUORO FOR SURGICAL PROCEDURES

## 2019-05-14 PROCEDURE — 62327 NJX INTERLAMINAR LMBR/SAC: CPT

## 2019-05-14 PROCEDURE — 6360000004 HC RX CONTRAST MEDICATION: Performed by: PAIN MEDICINE

## 2019-05-14 RX ORDER — TRIAMCINOLONE ACETONIDE 40 MG/ML
INJECTION, SUSPENSION INTRA-ARTICULAR; INTRAMUSCULAR
Status: COMPLETED | OUTPATIENT
Start: 2019-05-14 | End: 2019-05-14

## 2019-05-14 RX ADMIN — IOHEXOL 10 ML: 180 INJECTION INTRAVENOUS at 08:50

## 2019-05-14 RX ADMIN — TRIAMCINOLONE ACETONIDE 80 MG: 40 INJECTION, SUSPENSION INTRA-ARTICULAR; INTRAMUSCULAR at 08:55

## 2019-05-14 ASSESSMENT — PAIN DESCRIPTION - DIRECTION: RADIATING_TOWARDS: RIGHT LEG

## 2019-05-14 ASSESSMENT — PAIN DESCRIPTION - LOCATION: LOCATION: BACK

## 2019-05-14 ASSESSMENT — PAIN DESCRIPTION - PROGRESSION: CLINICAL_PROGRESSION: GRADUALLY WORSENING

## 2019-05-14 ASSESSMENT — PAIN - FUNCTIONAL ASSESSMENT
PAIN_FUNCTIONAL_ASSESSMENT: PREVENTS OR INTERFERES SOME ACTIVE ACTIVITIES AND ADLS
PAIN_FUNCTIONAL_ASSESSMENT: 0-10

## 2019-05-14 ASSESSMENT — PAIN DESCRIPTION - ORIENTATION: ORIENTATION: LOWER;RIGHT

## 2019-05-14 ASSESSMENT — PAIN DESCRIPTION - FREQUENCY: FREQUENCY: CONTINUOUS

## 2019-05-14 ASSESSMENT — PAIN SCALES - GENERAL: PAINLEVEL_OUTOF10: 4

## 2019-05-14 ASSESSMENT — PAIN DESCRIPTION - DESCRIPTORS: DESCRIPTORS: BURNING;TINGLING

## 2019-05-14 ASSESSMENT — PAIN DESCRIPTION - PAIN TYPE: TYPE: CHRONIC PAIN

## 2019-05-14 ASSESSMENT — PAIN DESCRIPTION - ONSET: ONSET: ON-GOING

## 2019-05-14 NOTE — PROGRESS NOTES
Discharge criteria met. Patient alert and oriented x3  Post procedure dressing dry and intact. Sensory and motor function intact as per pre-procedure. Instructions and follow up reviewed with pt at patient at discharge.   Patient discharged ambulatory @ 0901 with steady gait, declines wheelchair, wife to drive home- Guzman De La Fuente

## 2019-05-14 NOTE — PROCEDURES
Pre-Procedure Note    Patient Name: Bertram Jacobsen   YOB: 1945  Room/Bed: Room/bed info not found  Medical Record Number: [id number]  Date: 5/14/2019       Indication:    1. Lumbar radiculopathy    2. DDD (degenerative disc disease), lumbar    3. Spondylolisthesis of lumbar region        Consent: On file. Vital Signs:   Vitals:    05/14/19 0850   BP: 130/78   Pulse: 54   Resp: 16   Temp:    SpO2: 100%       Past Medical History:   has a past medical history of Acute low back pain, Agent orange exposure, Alcoholism ([facility] [address] Anemia, Santos's esophagus, Bradycardia, Cancer (HCC), Colon polyps, Depression, Hyperlipidemia, Hypertension, Lumbar radiculopathy, Osteoarthritis, PTSD (post-traumatic stress disorder), Renal cyst, left, and Rib fracture. Past Surgical History:   has a past surgical history that includes laminectomy; shoulder surgery (Right); Colonoscopy; Upper gastrointestinal endoscopy; Upper gastrointestinal endoscopy (N/A, 8/29/2018); and Lumbar Epidural Steroid Injection/Caudal (3/5/2019). Pre-Sedation Documentation and Exam:   Vital signs have been reviewed (see flow sheet for vitals). Mallampati Airway Assessment:  normal    ASA Classification:  Class 3 - A patient with severe systemic disease that limits activity but is not incapacitating    Sedation/ Anesthesia Plan:   intravenous sedation   as needed. Medications Planned:   midazolam (Versed) / Fentanyl  Intravenously  as needed. Patient is an appropriate candidate for plan of sedation: yes  Patient's History and Physical examination was reviewed and there is no change. Electronically signed by Lida Sierra MD on 5/14/2019 at 8:57 AM        Preoperative Diagnosis:    1. Lumbar radiculopathy    2. DDD (degenerative disc disease), lumbar    3. Spondylolisthesis of lumbar region        Postoperative Diagnosis:    1. Lumbar radiculopathy    2. DDD (degenerative disc disease), lumbar    3.  Spondylolisthesis of lumbar

## 2019-09-13 ENCOUNTER — PATIENT MESSAGE (OUTPATIENT)
Dept: UROLOGY | Age: 74
End: 2019-09-13

## 2019-09-16 RX ORDER — SILDENAFIL 25 MG/1
25 TABLET, FILM COATED ORAL PRN
Qty: 30 TABLET | Refills: 5 | Status: SHIPPED | OUTPATIENT
Start: 2019-09-16 | End: 2019-09-25

## 2019-09-23 ENCOUNTER — TELEPHONE (OUTPATIENT)
Dept: ONCOLOGY | Age: 74
End: 2019-09-23

## 2019-09-23 ENCOUNTER — PATIENT MESSAGE (OUTPATIENT)
Dept: UROLOGY | Age: 74
End: 2019-09-23

## 2019-09-23 DIAGNOSIS — Z87.891 PERSONAL HISTORY OF NICOTINE DEPENDENCE: Primary | ICD-10-CM

## 2019-09-25 RX ORDER — SILDENAFIL 25 MG/1
25 TABLET, FILM COATED ORAL PRN
Qty: 30 TABLET | Refills: 5 | Status: SHIPPED | OUTPATIENT
Start: 2019-09-25

## 2019-09-25 NOTE — TELEPHONE ENCOUNTER
From: Sabrina Harvey  To: Michele Holguin MD  Sent: 9/23/2019 2:34 AM EDT  Subject: Prescription Question    25mg, When ready can I pick it up?  ----- Message -----  From: Ricco Dam: 9/13/2019 8:30 AM EDT  To: Sabrina Harvey  Subject: RE: Prescription Question  Per our records, it doesn't look like the Dr Isaiah Hartley has prescribed sildenafil in quite some time. Can you please provide the strength of the medication so he can refill it? Thank you,  Senait Arita, [address] [facility]        ----- Message -----   From: Sabrina Harvey   Sent: 9/13/2019 8:21 AM EDT   To: Michele Holguin MD  Subject: Prescription Question    Would like a refill on my sildenafil.  Misplaced the previous withn the script #

## 2019-10-20 ENCOUNTER — HOSPITAL ENCOUNTER (EMERGENCY)
Age: 74
Discharge: HOME | End: 2019-10-20
Attending: EMERGENCY MEDICINE
Payer: MEDICARE

## 2019-10-20 VITALS
DIASTOLIC BLOOD PRESSURE: 75 MMHG | OXYGEN SATURATION: 100 % | TEMPERATURE: 98.6 F | BODY MASS INDEX: 25.18 KG/M2 | WEIGHT: 170 LBS | HEART RATE: 56 BPM | HEIGHT: 69 IN | SYSTOLIC BLOOD PRESSURE: 136 MMHG | RESPIRATION RATE: 14 BRPM

## 2019-10-20 DIAGNOSIS — T16.2XXA FOREIGN BODY OF LEFT EAR, INITIAL ENCOUNTER: Primary | ICD-10-CM

## 2019-10-20 PROCEDURE — 99282 EMERGENCY DEPT VISIT SF MDM: CPT

## 2019-10-20 PROCEDURE — 69200 CLEAR OUTER EAR CANAL: CPT

## 2019-10-21 ENCOUNTER — TELEPHONE (OUTPATIENT)
Dept: INTERNAL MEDICINE CLINIC | Age: 74
End: 2019-10-21

## 2019-12-03 ENCOUNTER — HOSPITAL ENCOUNTER (OUTPATIENT)
Dept: CT IMAGING | Age: 74
Discharge: HOME | End: 2019-12-05
Payer: MEDICARE

## 2019-12-03 DIAGNOSIS — Z87.891 PERSONAL HISTORY OF NICOTINE DEPENDENCE: ICD-10-CM

## 2019-12-03 PROCEDURE — G0297 LDCT FOR LUNG CA SCREEN: HCPCS

## 2020-11-25 ENCOUNTER — TELEPHONE (OUTPATIENT)
Dept: ONCOLOGY | Age: 75
End: 2020-11-25

## 2020-11-25 NOTE — TELEPHONE ENCOUNTER
Our records indicate that your patient is due for their annual lung cancer screening follow up testing. For your convenience, we have pended the order for the scan for you. If you do not agree with the need for the test, please cancel the order and let us know. Sincerely,    [address] [place] [address] Screening Program    Auto printed reminder letter sent to patient.

## 2022-03-17 ENCOUNTER — APPOINTMENT (RX ONLY)
Dept: URBAN - METROPOLITAN AREA CLINIC 157 | Facility: CLINIC | Age: 77
Setting detail: DERMATOLOGY
End: 2022-03-17

## 2022-03-17 DIAGNOSIS — L81.4 OTHER MELANIN HYPERPIGMENTATION: ICD-10-CM

## 2022-03-17 DIAGNOSIS — D18.0 HEMANGIOMA: ICD-10-CM

## 2022-03-17 DIAGNOSIS — L82.1 OTHER SEBORRHEIC KERATOSIS: ICD-10-CM

## 2022-03-17 DIAGNOSIS — L259 CONTACT DERMATITIS AND OTHER ECZEMA, UNSPECIFIED CAUSE: ICD-10-CM

## 2022-03-17 DIAGNOSIS — D485 NEOPLASM OF UNCERTAIN BEHAVIOR OF SKIN: ICD-10-CM

## 2022-03-17 PROBLEM — L23.9 ALLERGIC CONTACT DERMATITIS, UNSPECIFIED CAUSE: Status: ACTIVE | Noted: 2022-03-17

## 2022-03-17 PROBLEM — D48.5 NEOPLASM OF UNCERTAIN BEHAVIOR OF SKIN: Status: ACTIVE | Noted: 2022-03-17

## 2022-03-17 PROBLEM — D18.01 HEMANGIOMA OF SKIN AND SUBCUTANEOUS TISSUE: Status: ACTIVE | Noted: 2022-03-17

## 2022-03-17 PROCEDURE — 11102 TANGNTL BX SKIN SINGLE LES: CPT

## 2022-03-17 PROCEDURE — ? FULL BODY SKIN EXAM

## 2022-03-17 PROCEDURE — 99203 OFFICE O/P NEW LOW 30 MIN: CPT | Mod: 25

## 2022-03-17 PROCEDURE — ? COUNSELING

## 2022-03-17 PROCEDURE — ? TREATMENT REGIMEN

## 2022-03-17 PROCEDURE — ? BIOPSY BY SHAVE METHOD

## 2022-03-17 PROCEDURE — ? ADDITIONAL NOTES

## 2022-03-17 ASSESSMENT — LOCATION DETAILED DESCRIPTION DERM
LOCATION DETAILED: EPIGASTRIC SKIN
LOCATION DETAILED: LEFT POSTERIOR SHOULDER
LOCATION DETAILED: RIGHT LATERAL ABDOMEN
LOCATION DETAILED: LEFT MEDIAL SUPERIOR CHEST
LOCATION DETAILED: LEFT INFERIOR UPPER BACK
LOCATION DETAILED: RIGHT INFERIOR UPPER BACK

## 2022-03-17 ASSESSMENT — LOCATION SIMPLE DESCRIPTION DERM
LOCATION SIMPLE: LEFT UPPER BACK
LOCATION SIMPLE: LEFT SHOULDER
LOCATION SIMPLE: ABDOMEN
LOCATION SIMPLE: CHEST
LOCATION SIMPLE: RIGHT UPPER BACK

## 2022-03-17 ASSESSMENT — LOCATION ZONE DERM
LOCATION ZONE: TRUNK
LOCATION ZONE: ARM

## 2022-03-17 NOTE — PROCEDURE: BIOPSY BY SHAVE METHOD
Detail Level: Detailed
Depth Of Biopsy: dermis
Was A Bandage Applied: Yes
Size Of Lesion In Cm: 0
Biopsy Type: H and E
Biopsy Method: double edge Personna blade
Anesthesia Type: 1% lidocaine with epinephrine
Anesthesia Volume In Cc (Will Not Render If 0): 0.5
Hemostasis: Nicky's
Wound Care: Petrolatum
Dressing: bandage
Destruction After The Procedure: No
Type Of Destruction Used: Curettage
Curettage Text: The wound bed was treated with curettage after the biopsy was performed.
Cryotherapy Text: The wound bed was treated with cryotherapy after the biopsy was performed.
Electrodesiccation Text: The wound bed was treated with electrodesiccation after the biopsy was performed.
Electrodesiccation And Curettage Text: The wound bed was treated with electrodesiccation and curettage after the biopsy was performed.
Silver Nitrate Text: The wound bed was treated with silver nitrate after the biopsy was performed.
Lab: 6
Lab Facility: [id number]
Consent: Written consent was obtained and risks were reviewed including but not limited to scarring, infection, bleeding, scabbing, incomplete removal, nerve damage and allergy to anesthesia.
Post-Care Instructions: I reviewed with the patient in detail post-care instructions. Patient is to keep the biopsy site dry overnight, and then apply vasoline twice daily until healed.
Notification Instructions: Patient will be notified of biopsy results. However, patient instructed to call the office if not contacted within 2 weeks.
Billing Type: Third-Party Bill
Information: Selecting Yes will display possible errors in your note based on the variables you have selected. This validation is only offered as a suggestion for you. PLEASE NOTE THAT THE VALIDATION TEXT WILL BE REMOVED WHEN YOU FINALIZE YOUR NOTE. IF YOU WANT TO FAX A PRELIMINARY NOTE YOU WILL NEED TO TOGGLE THIS TO 'NO' IF YOU DO NOT WANT IT IN YOUR FAXED NOTE.

## 2022-03-17 NOTE — PROCEDURE: MIPS QUALITY
Quality 431: Preventive Care And Screening: Unhealthy Alcohol Use - Screening: Patient screened for unhealthy alcohol use using a single question and scores less than 2 times per year
Quality 226: Preventive Care And Screening: Tobacco Use: Screening And Cessation Intervention: Patient screened for tobacco use and is an ex/non-smoker
Detail Level: Detailed
Quality 130: Documentation Of Current Medications In The Medical Record: Current Medications Documented
Quality 47: Advance Care Plan: Advance care planning not documented, reason not otherwise specified.
Quality 110: Preventive Care And Screening: Influenza Immunization: Influenza Immunization Administered during Influenza season
Quality 111:Pneumonia Vaccination Status For Older Adults: Pneumococcal Vaccination Previously Received

## 2023-06-01 ENCOUNTER — OFFICE VISIT (OUTPATIENT)
Dept: ORTHOPEDIC SURGERY | Age: 78
End: 2023-06-01
Payer: MEDICARE

## 2023-06-01 VITALS — RESPIRATION RATE: 14 BRPM | HEIGHT: 69 IN | BODY MASS INDEX: 26.36 KG/M2 | WEIGHT: 178 LBS

## 2023-06-01 DIAGNOSIS — M48.062 LUMBAR STENOSIS WITH NEUROGENIC CLAUDICATION: Primary | ICD-10-CM

## 2023-06-01 DIAGNOSIS — M54.50 LUMBAR BACK PAIN: ICD-10-CM

## 2023-06-01 DIAGNOSIS — M41.50 DEGENERATIVE SCOLIOSIS IN ADULT PATIENT: ICD-10-CM

## 2023-06-01 PROCEDURE — 1123F ACP DISCUSS/DSCN MKR DOCD: CPT | Performed by: ORTHOPAEDIC SURGERY

## 2023-06-01 PROCEDURE — G8419 CALC BMI OUT NRM PARAM NOF/U: HCPCS | Performed by: ORTHOPAEDIC SURGERY

## 2023-06-01 PROCEDURE — G8427 DOCREV CUR MEDS BY ELIG CLIN: HCPCS | Performed by: ORTHOPAEDIC SURGERY

## 2023-06-01 PROCEDURE — 1036F TOBACCO NON-USER: CPT | Performed by: ORTHOPAEDIC SURGERY

## 2023-06-01 PROCEDURE — 99203 OFFICE O/P NEW LOW 30 MIN: CPT | Performed by: ORTHOPAEDIC SURGERY

## 2023-06-01 NOTE — PROGRESS NOTES
and nursing note reviewed. Constitutional:       Appearance: well-developed. HENT:      Head: Normocephalic and atraumatic. Nose: Nose normal.   Eyes:      Conjunctiva/sclera: Conjunctivae normal.   Neck:      Musculoskeletal: Normal range of motion and neck supple. Pulmonary:      Effort: Pulmonary effort is normal. No respiratory distress. Musculoskeletal:      Comments: Normal gait     Skin:     General: Skin is warm and dry. Neurological:      Mental Status: Alert and oriented to person, place, and time. Sensory: No sensory deficit. Psychiatric:         Behavior: Behavior normal.         Thought Content: Thought content normal.    Patient normal gait    Despite degenerative scoliosis patient overall with satisfactory coronal and sagittal alignment globally    Provider Attestation:  Joel Cowart, personally performed the services described in this documentation. All medical record entries made by the scribe were at my direction and in my presence. I have reviewed the chart and discharge instructions and agree that the records reflect my personal performance and is accurate and complete. Michael Loyd MD 6/1/23       Scribe Attestation:  By signing my name below, Mecca Jovanys, attest that this documentation has been prepared under the direction and in the presence of Dr. Horacio Simon. Electronically signed: Anushka Holm, 6/1/23     Please note that this chart was generated using voice recognition Dragon dictation software. Although every effort was made to ensure the accuracy of this automated transcription, some errors in transcription may have occurred.

## 2023-06-05 ENCOUNTER — HOSPITAL ENCOUNTER (OUTPATIENT)
Dept: MRI IMAGING | Facility: CLINIC | Age: 78
Discharge: HOME | End: 2023-06-07
Payer: MEDICARE

## 2023-06-05 DIAGNOSIS — M41.50 DEGENERATIVE SCOLIOSIS IN ADULT PATIENT: ICD-10-CM

## 2023-06-05 DIAGNOSIS — M48.062 LUMBAR STENOSIS WITH NEUROGENIC CLAUDICATION: ICD-10-CM

## 2023-06-05 PROCEDURE — 72148 MRI LUMBAR SPINE W/O DYE: CPT

## 2023-06-20 ENCOUNTER — OFFICE VISIT (OUTPATIENT)
Dept: ORTHOPEDIC SURGERY | Age: 78
End: 2023-06-20
Payer: MEDICARE

## 2023-06-20 VITALS — RESPIRATION RATE: 12 BRPM | HEIGHT: 69 IN | BODY MASS INDEX: 25.92 KG/M2 | WEIGHT: 175 LBS

## 2023-06-20 DIAGNOSIS — M54.50 LUMBAR BACK PAIN: Primary | ICD-10-CM

## 2023-06-20 DIAGNOSIS — M41.50 DEGENERATIVE SCOLIOSIS IN ADULT PATIENT: ICD-10-CM

## 2023-06-20 DIAGNOSIS — M43.10 ACQUIRED SPONDYLOLISTHESIS: ICD-10-CM

## 2023-06-20 DIAGNOSIS — M48.062 LUMBAR STENOSIS WITH NEUROGENIC CLAUDICATION: ICD-10-CM

## 2023-06-20 PROCEDURE — G8419 CALC BMI OUT NRM PARAM NOF/U: HCPCS | Performed by: ORTHOPAEDIC SURGERY

## 2023-06-20 PROCEDURE — 99213 OFFICE O/P EST LOW 20 MIN: CPT | Performed by: ORTHOPAEDIC SURGERY

## 2023-06-20 PROCEDURE — G8427 DOCREV CUR MEDS BY ELIG CLIN: HCPCS | Performed by: ORTHOPAEDIC SURGERY

## 2023-06-20 PROCEDURE — 1036F TOBACCO NON-USER: CPT | Performed by: ORTHOPAEDIC SURGERY

## 2023-06-20 PROCEDURE — 1123F ACP DISCUSS/DSCN MKR DOCD: CPT | Performed by: ORTHOPAEDIC SURGERY

## 2023-06-20 NOTE — PROGRESS NOTES
6/20/23       Scribe Attestation:  By signing my name below, Sushma Stevensbisiisabelle, attest that this documentation has been prepared under the direction and in the presence of Dr. Katelynn Hardy. Electronically signed: Anushka Cooper, 6/20/23     Please note that this chart was generated using voice recognition Dragon dictation software. Although every effort was made to ensure the accuracy of this automated transcription, some errors in transcription may have occurred.

## 2023-06-26 ENCOUNTER — PATIENT MESSAGE (OUTPATIENT)
Dept: ORTHOPEDIC SURGERY | Age: 78
End: 2023-06-26

## 2023-07-23 ENCOUNTER — PATIENT MESSAGE (OUTPATIENT)
Dept: ORTHOPEDIC SURGERY | Age: 78
End: 2023-07-23

## 2023-07-24 ENCOUNTER — TELEPHONE (OUTPATIENT)
Dept: ORTHOPEDIC SURGERY | Age: 78
End: 2023-07-24

## 2023-07-24 NOTE — TELEPHONE ENCOUNTER
My Chart Message:    [facility] Clinical Support Pool (supporting Dorothy Horowitz MD) 15 hours ago (7:16 PM)       Would the Vertiflex procedure be suitable for my back?

## 2023-07-24 NOTE — TELEPHONE ENCOUNTER
From: Edinson Cue  To: Dr. Shemar Jeff: 7/23/2023 7:16 PM EDT  Subject: Back procedure     Would the Vertiflex procedure be suitable for my back?

## 2023-07-31 ENCOUNTER — INITIAL CONSULT (OUTPATIENT)
Dept: PAIN MANAGEMENT | Age: 78
End: 2023-07-31
Payer: MEDICARE

## 2023-07-31 VITALS — HEIGHT: 69 IN | BODY MASS INDEX: 25.48 KG/M2 | WEIGHT: 172 LBS

## 2023-07-31 DIAGNOSIS — M47.817 LUMBOSACRAL SPONDYLOSIS WITHOUT MYELOPATHY: ICD-10-CM

## 2023-07-31 DIAGNOSIS — M46.1 SACROILIITIS (HCC): Primary | ICD-10-CM

## 2023-07-31 DIAGNOSIS — M51.36 DEGENERATION OF LUMBAR INTERVERTEBRAL DISC: ICD-10-CM

## 2023-07-31 PROCEDURE — 1036F TOBACCO NON-USER: CPT | Performed by: PAIN MEDICINE

## 2023-07-31 PROCEDURE — G8427 DOCREV CUR MEDS BY ELIG CLIN: HCPCS | Performed by: PAIN MEDICINE

## 2023-07-31 PROCEDURE — G8419 CALC BMI OUT NRM PARAM NOF/U: HCPCS | Performed by: PAIN MEDICINE

## 2023-07-31 PROCEDURE — 1123F ACP DISCUSS/DSCN MKR DOCD: CPT | Performed by: PAIN MEDICINE

## 2023-07-31 PROCEDURE — 99204 OFFICE O/P NEW MOD 45 MIN: CPT | Performed by: PAIN MEDICINE

## 2023-07-31 PROCEDURE — 27096 INJECT SACROILIAC JOINT: CPT | Performed by: PAIN MEDICINE

## 2023-07-31 ASSESSMENT — ENCOUNTER SYMPTOMS
BACK PAIN: 1
BOWEL INCONTINENCE: 0

## 2023-08-24 ENCOUNTER — HOSPITAL ENCOUNTER (OUTPATIENT)
Dept: PAIN MANAGEMENT | Facility: CLINIC | Age: 78
Discharge: HOME | End: 2023-08-24
Payer: MEDICARE

## 2023-08-24 VITALS
BODY MASS INDEX: 24.34 KG/M2 | WEIGHT: 170 LBS | SYSTOLIC BLOOD PRESSURE: 148 MMHG | HEIGHT: 70 IN | TEMPERATURE: 98.2 F | HEART RATE: 49 BPM | OXYGEN SATURATION: 97 % | RESPIRATION RATE: 13 BRPM | DIASTOLIC BLOOD PRESSURE: 79 MMHG

## 2023-08-24 DIAGNOSIS — R52 PAIN MANAGEMENT: ICD-10-CM

## 2023-08-24 PROCEDURE — 2500000003 HC RX 250 WO HCPCS: Performed by: PAIN MEDICINE

## 2023-08-24 PROCEDURE — G0260 INJ FOR SACROILIAC JT ANESTH: HCPCS

## 2023-08-24 PROCEDURE — 85610 PROTHROMBIN TIME: CPT

## 2023-08-24 PROCEDURE — 6360000002 HC RX W HCPCS: Performed by: PAIN MEDICINE

## 2023-08-24 PROCEDURE — 2580000003 HC RX 258: Performed by: PAIN MEDICINE

## 2023-08-24 RX ORDER — DEXAMETHASONE SODIUM PHOSPHATE 10 MG/ML
INJECTION, SOLUTION INTRAMUSCULAR; INTRAVENOUS
Status: COMPLETED | OUTPATIENT
Start: 2023-08-24 | End: 2023-08-24

## 2023-08-24 RX ORDER — SODIUM CHLORIDE 0.9 % (FLUSH) 0.9 %
SYRINGE (ML) INJECTION
Status: COMPLETED | OUTPATIENT
Start: 2023-08-24 | End: 2023-08-24

## 2023-08-24 RX ORDER — LIDOCAINE HYDROCHLORIDE 10 MG/ML
INJECTION, SOLUTION EPIDURAL; INFILTRATION; INTRACAUDAL; PERINEURAL
Status: COMPLETED | OUTPATIENT
Start: 2023-08-24 | End: 2023-08-24

## 2023-08-24 RX ORDER — BUPIVACAINE HYDROCHLORIDE 2.5 MG/ML
INJECTION, SOLUTION EPIDURAL; INFILTRATION; INTRACAUDAL
Status: COMPLETED | OUTPATIENT
Start: 2023-08-24 | End: 2023-08-24

## 2023-08-24 RX ADMIN — BUPIVACAINE HYDROCHLORIDE 2.5 ML: 2.5 INJECTION, SOLUTION EPIDURAL; INFILTRATION; INTRACAUDAL; PERINEURAL at 14:48

## 2023-08-24 RX ADMIN — Medication 3 ML: at 14:47

## 2023-08-24 RX ADMIN — DEXAMETHASONE SODIUM PHOSPHATE 5 MG: 10 INJECTION, SOLUTION INTRAMUSCULAR; INTRAVENOUS at 14:48

## 2023-08-24 RX ADMIN — LIDOCAINE HYDROCHLORIDE 3 ML: 10 INJECTION, SOLUTION EPIDURAL; INFILTRATION; INTRACAUDAL at 14:47

## 2023-08-24 ASSESSMENT — PAIN DESCRIPTION - DESCRIPTORS: DESCRIPTORS: SHARP

## 2023-08-24 ASSESSMENT — PAIN - FUNCTIONAL ASSESSMENT: PAIN_FUNCTIONAL_ASSESSMENT: 0-10

## 2023-08-24 NOTE — DISCHARGE INSTRUCTIONS

## 2023-08-24 NOTE — OP NOTE
Sacro-Iliac Joint Injection:  SURGEON: James Agosto MD    PRE-OP DIAGNOSIS: Sacroiliitis (M46.1), Low back pain (M54.5)    POST-OP DIAGNOSIS: Same. Procedure performed: Right Sacroiliac Joint Injection. Physician confirmed and marked the surgical site. EBL: minimal    CONSENT: Patient has undergone the educational process with this procedure, is aware and fully understands the risks involved: potential damage to any and all body organs including possible bleeding, infection, and nerve injury, allergic reaction and headache. Patient also understands that the procedure will be undertaken in a safe, controlled and monitored setting. Patient recognizes that the benefits may include relief from pain and reduction in the oral use of medications. Patient agreed to proceed. The patient was counseled at length about the risks of sonido Covid-19 during their perioperative period and any recovery window from their procedure. The patient was made aware that sonido Covid-19  may worsen their prognosis for recovering from their procedure  and lend to a higher morbidity and/or mortality risk. All material risks, benefits, and reasonable alternatives including postponing the procedure were discussed. The patient does wish to proceed with the procedure at this time. PREP: The patient's back was prepped with chloroprep and draped appropriately. 5ml of 0.5% lidocaine was used to anesthetize the skin and subcutaneous tissue. PROCEDURE NOTE: A 25 gauge spinal needle was advanced to the  Right SI joint under fluoroscopic guidance. Aspiration was negative. 2ml of  0.25% bupivacaine was mixed with 5mg Dexamethasone and slowly injected into the joint(s). The needle was withdrawn by the physician and the nurse applied a sterile dressing. The patient tolerated the procedure well. No complications occurred. Patient transferred to the recovery room in satisfactory condition.  Appropriate written discharge
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
## 2023-08-25 LAB
POC INR: 1
PROTHROMBIN TIME, POC: 12.5 SEC (ref 9.6–14.4)

## 2023-09-08 ENCOUNTER — OFFICE VISIT (OUTPATIENT)
Dept: PAIN MANAGEMENT | Age: 78
End: 2023-09-08
Payer: MEDICARE

## 2023-09-08 VITALS — HEIGHT: 70 IN | WEIGHT: 171 LBS | BODY MASS INDEX: 24.48 KG/M2

## 2023-09-08 DIAGNOSIS — M47.817 LUMBOSACRAL SPONDYLOSIS WITHOUT MYELOPATHY: ICD-10-CM

## 2023-09-08 DIAGNOSIS — G89.29 CHRONIC RIGHT HIP PAIN: Primary | ICD-10-CM

## 2023-09-08 DIAGNOSIS — M46.1 SACROILIITIS (HCC): ICD-10-CM

## 2023-09-08 DIAGNOSIS — M25.551 CHRONIC RIGHT HIP PAIN: Primary | ICD-10-CM

## 2023-09-08 PROCEDURE — 99214 OFFICE O/P EST MOD 30 MIN: CPT | Performed by: NURSE PRACTITIONER

## 2023-09-08 PROCEDURE — G8420 CALC BMI NORM PARAMETERS: HCPCS | Performed by: NURSE PRACTITIONER

## 2023-09-08 PROCEDURE — 1036F TOBACCO NON-USER: CPT | Performed by: NURSE PRACTITIONER

## 2023-09-08 PROCEDURE — G8427 DOCREV CUR MEDS BY ELIG CLIN: HCPCS | Performed by: NURSE PRACTITIONER

## 2023-09-08 PROCEDURE — 1123F ACP DISCUSS/DSCN MKR DOCD: CPT | Performed by: NURSE PRACTITIONER

## 2023-09-08 ASSESSMENT — ENCOUNTER SYMPTOMS
SHORTNESS OF BREATH: 0
BOWEL INCONTINENCE: 0
BACK PAIN: 1
COUGH: 0
CONSTIPATION: 0

## 2023-09-08 NOTE — PROGRESS NOTES
narrowing. L3-L4: There is a circumferential disc bulge with facet hypertrophy. There  is no canal stenosis. There is moderate to severe foraminal narrowing. L4-L5: There is a circumferential disc bulge with facet hypertrophy. There  is no canal stenosis. There is moderate foraminal narrowing. L5-S1: There is a circumferential disc bulge with facet hypertrophy. There  is no canal stenosis. There is mild right and moderate to severe left  foraminal narrowing. IMPRESSION:  Scoliotic curvature with multilevel degenerative change resulting in  foraminal narrowing bilaterally as described above. Assessment:  Problem List Items Addressed This Visit       Chronic right hip pain - Primary    Relevant Orders    XR HIP RIGHT (2-3 VIEWS)    Lumbosacral spondylosis without myelopathy    Sacroiliitis (HCC)    Relevant Orders    FACET JOINT L/S SINGLE          Treatment Plan:  Significant relief following confirmatory right SI joint injection that lasted 3-4 days  Pt would like to schedule right SI RFA  He is also complaining of right hip pain  XR right hip to evaluate  He is scheduled to see Dr. Maciel Spine for right shoulder pain    Follow up after procedure    I have reviewed the chief complaint and history of present illness (including ROS and [address]) and vital documentation by my staff and I agree with their documentation and have added where applicable.

## 2023-09-14 ENCOUNTER — OFFICE VISIT (OUTPATIENT)
Dept: GASTROENTEROLOGY | Age: 78
End: 2023-09-14
Payer: MEDICARE

## 2023-09-14 ENCOUNTER — TELEPHONE (OUTPATIENT)
Dept: ONCOLOGY | Age: 78
End: 2023-09-14

## 2023-09-14 VITALS
SYSTOLIC BLOOD PRESSURE: 139 MMHG | TEMPERATURE: 98 F | DIASTOLIC BLOOD PRESSURE: 81 MMHG | WEIGHT: 177 LBS | BODY MASS INDEX: 25.4 KG/M2

## 2023-09-14 DIAGNOSIS — K22.70 BARRETT'S ESOPHAGUS WITHOUT DYSPLASIA: Primary | ICD-10-CM

## 2023-09-14 DIAGNOSIS — K57.90 DIVERTICULOSIS: ICD-10-CM

## 2023-09-14 DIAGNOSIS — K21.9 GASTROESOPHAGEAL REFLUX DISEASE, UNSPECIFIED WHETHER ESOPHAGITIS PRESENT: ICD-10-CM

## 2023-09-14 DIAGNOSIS — D12.2 ADENOMATOUS POLYP OF ASCENDING COLON: ICD-10-CM

## 2023-09-14 PROCEDURE — G8427 DOCREV CUR MEDS BY ELIG CLIN: HCPCS | Performed by: INTERNAL MEDICINE

## 2023-09-14 PROCEDURE — G8419 CALC BMI OUT NRM PARAM NOF/U: HCPCS | Performed by: INTERNAL MEDICINE

## 2023-09-14 PROCEDURE — 99204 OFFICE O/P NEW MOD 45 MIN: CPT | Performed by: INTERNAL MEDICINE

## 2023-09-14 PROCEDURE — 1036F TOBACCO NON-USER: CPT | Performed by: INTERNAL MEDICINE

## 2023-09-14 PROCEDURE — 1123F ACP DISCUSS/DSCN MKR DOCD: CPT | Performed by: INTERNAL MEDICINE

## 2023-09-14 ASSESSMENT — ENCOUNTER SYMPTOMS
COLOR CHANGE: 0
CONSTIPATION: 0
COUGH: 0
ABDOMINAL DISTENTION: 0
BLOOD IN STOOL: 0
DIARRHEA: 0
ABDOMINAL PAIN: 0
SORE THROAT: 0
CHOKING: 0
SHORTNESS OF BREATH: 0
RECTAL PAIN: 0
NAUSEA: 0
TROUBLE SWALLOWING: 0
WHEEZING: 0
VOMITING: 0
ANAL BLEEDING: 0

## 2023-09-14 NOTE — PROGRESS NOTES
and Cardio Respiratory risks were explained  his questions were answered  The procedure has been scheduled with the  in the office  Patient was asked to give us a call for any questions  The patient has verbalized understanding and agreement to this plan. Pt seems to have signs and symptoms consistent with GERD, acid indigestion and heartburns. He was discussed  in detail about some possible life style and dietary modifications. He was stressed about the maintenance  of appropriate weight and effect of obesity contributing to reflux symptoms. Routine exercise was streesed. Avoidance of Caffeine, nicotine and chocolate were explained. Pt was asked to avoid spices grease and fried food. Advices were also given about avoidance of any kind of fast foods, soda pops and high energy drinks. Pt was advised to place two small block under the head end of the bed which may help with night time reflux. Was advised not to eat any thin at least 2-3 hrs before going to bed and walk especially after dinner    Pt has verbalized understanding and agreement to this plan. Pt was discussed in detail about the possible side effects of proton pump inhibiter therapy. He was explained about the possibility of calcium and magnesium malabsorption and was advised to start taking calcium supplements with Vit D. Some over the counter regimens were explained to patient. Some dietary advices were also given. He has verbalized understanding and agreement to this. Thank you for allowing me to participate in the care of Mr. Haylee Darling. For any further questions please do not hesitate to contact me. I have reviewed and agree with the ROS entered by the MA/Nurse.      This note is created with the assistance of the speech recognition program.  While intending to generate a document that actually reflects the content of the visit, document can still have some errors including those of syntax and sound like substitutions

## 2023-09-21 ENCOUNTER — TELEPHONE (OUTPATIENT)
Dept: GASTROENTEROLOGY | Age: 78
End: 2023-09-21

## 2023-09-21 ENCOUNTER — HOSPITAL ENCOUNTER (OUTPATIENT)
Dept: CT IMAGING | Age: 78
Discharge: HOME | End: 2023-09-23
Attending: STUDENT IN AN ORGANIZED HEALTH CARE EDUCATION/TRAINING PROGRAM
Payer: MEDICARE

## 2023-09-21 ENCOUNTER — HOSPITAL ENCOUNTER (OUTPATIENT)
Age: 78
Discharge: HOME | End: 2023-09-23
Attending: STUDENT IN AN ORGANIZED HEALTH CARE EDUCATION/TRAINING PROGRAM
Payer: MEDICARE

## 2023-09-21 DIAGNOSIS — R00.1 BRADYCARDIA: ICD-10-CM

## 2023-09-21 DIAGNOSIS — Z87.891 PERSONAL HISTORY OF TOBACCO USE: ICD-10-CM

## 2023-09-21 PROCEDURE — 71271 CT THORAX LUNG CANCER SCR C-: CPT

## 2023-09-21 PROCEDURE — 93242 EXT ECG>48HR<7D RECORDING: CPT

## 2023-09-21 NOTE — TELEPHONE ENCOUNTER
M for patient to call the office to schedule procedures ordered on 9-14-23. The patient was scheduled for a holter monitor test ordered by PCP for brachycardia. To get clearance also.

## 2023-09-22 NOTE — TELEPHONE ENCOUNTER
Called the patient and scheduled for 1/15/24 STA @ 8:30 a.m. Reviewed bowel prep instructions with patient over phone and sent via [employer]. Clearance to PCP due to hold monitor being scheduled. Office ordered due to brachycardia. The patient will let us know if he gets referred to a cardiologist.  If so can be sent there. Writer thanked and call ended.

## 2023-09-25 RX ORDER — POLYETHYLENE GLYCOL 3350 17 G/17G
POWDER, FOR SOLUTION ORAL
Qty: 238 G | Refills: 0 | Status: SHIPPED | OUTPATIENT
Start: 2023-09-25

## 2023-09-25 RX ORDER — BISACODYL 5 MG/1
TABLET, DELAYED RELEASE ORAL
Qty: 4 TABLET | Refills: 0 | Status: SHIPPED | OUTPATIENT
Start: 2023-09-25

## 2023-10-02 ENCOUNTER — HOSPITAL ENCOUNTER (OUTPATIENT)
Dept: ULTRASOUND IMAGING | Age: 78
Discharge: HOME | End: 2023-10-04
Payer: MEDICARE

## 2023-10-02 DIAGNOSIS — R10.31 PAIN, ABDOMINAL, RLQ: ICD-10-CM

## 2023-10-02 PROCEDURE — 76857 US EXAM PELVIC LIMITED: CPT

## 2023-10-27 ENCOUNTER — HOSPITAL ENCOUNTER (OUTPATIENT)
Dept: CT IMAGING | Age: 78
End: 2023-10-27
Attending: STUDENT IN AN ORGANIZED HEALTH CARE EDUCATION/TRAINING PROGRAM
Payer: MEDICARE

## 2023-10-27 DIAGNOSIS — R10.31 RLQ ABDOMINAL PAIN: ICD-10-CM

## 2023-10-27 LAB
EGFR, POC: >60 ML/MIN/1.73M2
POC CREATININE: 0.6 MG/DL (ref 0.51–1.19)

## 2023-10-27 PROCEDURE — 74177 CT ABD & PELVIS W/CONTRAST: CPT

## 2023-10-27 PROCEDURE — 2580000003 HC RX 258: Performed by: STUDENT IN AN ORGANIZED HEALTH CARE EDUCATION/TRAINING PROGRAM

## 2023-10-27 PROCEDURE — 82565 ASSAY OF CREATININE: CPT

## 2023-10-27 PROCEDURE — 6360000004 HC RX CONTRAST MEDICATION: Performed by: STUDENT IN AN ORGANIZED HEALTH CARE EDUCATION/TRAINING PROGRAM

## 2023-10-27 RX ORDER — SODIUM CHLORIDE 0.9 % (FLUSH) 0.9 %
10 SYRINGE (ML) INJECTION PRN
Status: DISCONTINUED | OUTPATIENT
Start: 2023-10-27 | End: 2023-10-30 | Stop reason: HOSPADM

## 2023-10-27 RX ORDER — 0.9 % SODIUM CHLORIDE 0.9 %
100 INTRAVENOUS SOLUTION INTRAVENOUS ONCE
Status: COMPLETED | OUTPATIENT
Start: 2023-10-27 | End: 2023-10-27

## 2023-10-27 RX ADMIN — SODIUM CHLORIDE, PRESERVATIVE FREE 10 ML: 5 INJECTION INTRAVENOUS at 14:55

## 2023-10-27 RX ADMIN — IOPAMIDOL 75 ML: 755 INJECTION, SOLUTION INTRAVENOUS at 14:54

## 2023-10-27 RX ADMIN — SODIUM CHLORIDE 100 ML: 9 INJECTION, SOLUTION INTRAVENOUS at 14:55

## 2023-10-30 ENCOUNTER — OFFICE VISIT (OUTPATIENT)
Dept: DERMATOLOGY | Age: 78
End: 2023-10-30
Payer: MEDICARE

## 2023-10-30 VITALS
TEMPERATURE: 98.2 F | SYSTOLIC BLOOD PRESSURE: 148 MMHG | DIASTOLIC BLOOD PRESSURE: 79 MMHG | HEIGHT: 70 IN | BODY MASS INDEX: 25.05 KG/M2 | HEART RATE: 76 BPM | OXYGEN SATURATION: 99 % | WEIGHT: 175 LBS

## 2023-10-30 DIAGNOSIS — L57.0 ACTINIC KERATOSES: Primary | ICD-10-CM

## 2023-10-30 DIAGNOSIS — L21.9 SEBORRHEIC DERMATITIS: ICD-10-CM

## 2023-10-30 DIAGNOSIS — D23.9 DERMATOFIBROMA: ICD-10-CM

## 2023-10-30 DIAGNOSIS — D22.9 MULTIPLE NEVI: ICD-10-CM

## 2023-10-30 DIAGNOSIS — D18.01 CHERRY ANGIOMA: ICD-10-CM

## 2023-10-30 DIAGNOSIS — L82.1 SEBORRHEIC KERATOSES: ICD-10-CM

## 2023-10-30 PROCEDURE — 99203 OFFICE O/P NEW LOW 30 MIN: CPT | Performed by: DERMATOLOGY

## 2023-10-30 PROCEDURE — 1036F TOBACCO NON-USER: CPT | Performed by: DERMATOLOGY

## 2023-10-30 PROCEDURE — G8484 FLU IMMUNIZE NO ADMIN: HCPCS | Performed by: DERMATOLOGY

## 2023-10-30 PROCEDURE — 17003 DESTRUCT PREMALG LES 2-14: CPT | Performed by: DERMATOLOGY

## 2023-10-30 PROCEDURE — G8419 CALC BMI OUT NRM PARAM NOF/U: HCPCS | Performed by: DERMATOLOGY

## 2023-10-30 PROCEDURE — 17000 DESTRUCT PREMALG LESION: CPT | Performed by: DERMATOLOGY

## 2023-10-30 PROCEDURE — 1123F ACP DISCUSS/DSCN MKR DOCD: CPT | Performed by: DERMATOLOGY

## 2023-10-30 PROCEDURE — G8427 DOCREV CUR MEDS BY ELIG CLIN: HCPCS | Performed by: DERMATOLOGY

## 2023-10-30 RX ORDER — KETOCONAZOLE 20 MG/ML
SHAMPOO TOPICAL
Qty: 120 ML | Refills: 2 | Status: SHIPPED | OUTPATIENT
Start: 2023-10-30

## 2023-10-30 NOTE — PROGRESS NOTES
colonoscopy recomm q 5 y       Agent orange exposure        CURRENT MEDICATIONS:   Current Outpatient Medications   Medication Sig Dispense Refill    polyethylene glycol (GLYCOLAX) 17 GM/SCOOP powder Follow instructions given by provider 238 g 0    diclofenac (VOLTAREN) 50 MG EC tablet Take 1 tablet by mouth 2 times daily (with meals) 60 tablet 3    Omega-3 Fatty Acids (FISH OIL PO) Take by mouth      polyethyl glycol-propyl glycol 0.4-0.3 % (SYSTANE) 0.4-0.3 % ophthalmic solution INSTILL 1 DROP IN BOTH EYES EVERY 6 HOURS AS NEEDED FOR DRY EYE      Calcium Carbonate-Vit D-Min (CALCIUM 1200 PO)       esomeprazole Magnesium (NEXIUM) 20 MG PACK Take 1 packet by mouth daily      magnesium oxide (MAG-OX) 400 MG tablet Take 1 tablet by mouth daily      vitamin C (ASCORBIC ACID) 500 MG tablet Take 1 tablet by mouth 2 times daily      aspirin 81 MG tablet Take 1 tablet by mouth daily      Lactobacillus-Inulin (PROBIOTIC DIGESTIVE SUPPORT) CAPS Take 200 mg by mouth 2 times daily 60 capsule 5    vitamin B-6 (PYRIDOXINE) 50 MG tablet Take 1 tablet by mouth daily 30 tablet 3    Multiple Vitamin (MULTI-DAY VITAMINS) TABS Take 1 Dose by mouth daily 30 tablet 0    Coenzyme Q10 (COQ10) 100 MG CAPS Take 100 mg by mouth daily 30 capsule 0    bisacodyl 5 MG EC tablet Use as instructed from your physicians office for your colonoscopy prep. (Patient not taking: Reported on 10/30/2023) 4 tablet 0     No current facility-administered medications for this visit. ALLERGIES:   No Known Allergies    SOCIAL HISTORY:  Social History     Tobacco Use    Smoking status: Former     Packs/day: 1.00     Years: 50.00     Additional pack years: 0.00     Total pack years: 50.00     Types: Cigarettes     Start date: 9/17/1958     Quit date: 9/17/2008     Years since quitting: 15.1    Smokeless tobacco: Never    Tobacco comments:     on and off again, quit most recently two months ago.  generally smoke as a strees relief   Substance Use Topics

## 2023-10-30 NOTE — PATIENT INSTRUCTIONS
Seborrheic dermatitis of the scalp and beard  - chronic illness, responding to treatment but not yet at goal  - start ketoconazole 2% shampoo to scalp 3-4 weekly, leave in 5 min prior to rinsing     Cryotherapy    Liquid Nitrogen - \"freeze\" (Cryotherapy)  Your doctor has treated your skin lesions with a very cold substance. The liquid nitrogen is so cold that it may feel like the skin is burning during application. A clear blister or blood blister may form after treatment and may later form a scab. Leave the area alone. Usually this scab will fall of within 1-2 weeks. The area should be kept clean and can be covered with Vaseline and a Band-Aid if needed. If a large blister develops it is ok to use a clean needle to gently pop the blister. Please call our office with any concerns at [phone]. Sun Protection     There are two types of sun rays that are harmful to the skin. UVA rays cause skin aging and skin cancer, such as melanoma. UVB rays cause sunburns, cataracts, and also contribute to skin cancer. The American-Academy of Dermatology recommends that children and adults wear a broad spectrum, waterproof sunscreen with a Sun Protection Factor (SPF) of 30 or higher. It is important to check the ingredient label to be sure the sunscreen will protect the skin from both UVA and UVB sunrays. Your sunscreen should contain at least one of the following ingredients: titanium dioxide, zinc oxide, or avobenzone. Sunscreen will not be effective unless it is applied to all exposed skin. Sunscreens work best if they are applied 30 minutes before sun exposure. They should be reapplied every 2 hours and after any water exposure. Sunscreen is not perfect. It is important to use other methods to protect the skin from sun exposure also. Wear hats, sunglasses and other sun protective clothing when outdoors.   Stay in the shade during the peak hours of sun exposure between 10 AM and 4 PM.    [facility]

## 2023-12-04 ENCOUNTER — OFFICE VISIT (OUTPATIENT)
Dept: PAIN MANAGEMENT | Age: 78
End: 2023-12-04
Payer: MEDICARE

## 2023-12-04 VITALS — WEIGHT: 175 LBS | HEIGHT: 70 IN | BODY MASS INDEX: 25.05 KG/M2

## 2023-12-04 DIAGNOSIS — M46.1 SACROILIITIS (HCC): Primary | ICD-10-CM

## 2023-12-04 DIAGNOSIS — M47.817 LUMBOSACRAL SPONDYLOSIS WITHOUT MYELOPATHY: ICD-10-CM

## 2023-12-04 PROCEDURE — G8427 DOCREV CUR MEDS BY ELIG CLIN: HCPCS | Performed by: PAIN MEDICINE

## 2023-12-04 PROCEDURE — G8484 FLU IMMUNIZE NO ADMIN: HCPCS | Performed by: PAIN MEDICINE

## 2023-12-04 PROCEDURE — 1123F ACP DISCUSS/DSCN MKR DOCD: CPT | Performed by: PAIN MEDICINE

## 2023-12-04 PROCEDURE — G8419 CALC BMI OUT NRM PARAM NOF/U: HCPCS | Performed by: PAIN MEDICINE

## 2023-12-04 PROCEDURE — 1036F TOBACCO NON-USER: CPT | Performed by: PAIN MEDICINE

## 2023-12-04 PROCEDURE — 99214 OFFICE O/P EST MOD 30 MIN: CPT | Performed by: PAIN MEDICINE

## 2023-12-04 ASSESSMENT — ENCOUNTER SYMPTOMS
BACK PAIN: 1
BOWEL INCONTINENCE: 0

## 2023-12-04 NOTE — PROGRESS NOTES
Rfl: 0    Coenzyme Q10 (COQ10) 100 MG CAPS, Take 100 mg by mouth daily, Disp: 30 capsule, Rfl: 0    Family History   Problem Relation Age of Onset    Cancer Mother         lung mets to brain     Cancer Brother         prostate        Social History     Socioeconomic History    Marital status:      Spouse name: Not on file    Number of children: Not on file    Years of education: Not on file    Highest education level: Not on file   Occupational History    Not on file   Tobacco Use    Smoking status: Former     Packs/day: 1.00     Years: 50.00     Additional pack years: 0.00     Total pack years: 50.00     Types: Cigarettes     Start date: 9/17/1958     Quit date: 9/17/2008     Years since quitting: 15.2    Smokeless tobacco: Never    Tobacco comments:     on and off again, quit most recently two months ago. generally smoke as a strees relief   Vaping Use    Vaping Use: Never used   Substance and Sexual Activity    Alcohol use: No     Comment: quit 1990s ; prev qd drinking     Drug use: No    Sexual activity: Not Currently     Partners: Female   Other Topics Concern    Not on file   Social History Narrative    Not on file     Social Determinants of Health     Financial Resource Strain: Low Risk  (5/30/2023)    Overall Financial Resource Strain (CARDIA)     Difficulty of Paying Living Expenses: Not hard at all   Food Insecurity: Not on file (5/30/2023)   Transportation Needs: Unknown (5/30/2023)    PRAPARE - Transportation     Lack of Transportation (Medical): Not on file     Lack of Transportation (Non-Medical):  No   Physical Activity: Sufficiently Active (8/30/2023)    Exercise Vital Sign     Days of Exercise per Week: 7 days     Minutes of Exercise per Session: 90 min   Stress: Not on file   Social Connections: Not on file   Intimate Partner Violence: Not on file   Housing Stability: Unknown (5/30/2023)    Housing Stability Vital Sign     Unable to Pay for Housing in the Last Year: Not on file     Number of

## 2024-01-11 ENCOUNTER — HOSPITAL ENCOUNTER (OUTPATIENT)
Dept: PAIN MANAGEMENT | Facility: CLINIC | Age: 79
Discharge: HOME | End: 2024-01-11

## 2024-01-11 VITALS
SYSTOLIC BLOOD PRESSURE: 156 MMHG | WEIGHT: 170 LBS | BODY MASS INDEX: 25.18 KG/M2 | RESPIRATION RATE: 12 BRPM | HEIGHT: 69 IN | HEART RATE: 59 BPM | TEMPERATURE: 97.4 F | DIASTOLIC BLOOD PRESSURE: 83 MMHG | OXYGEN SATURATION: 98 %

## 2024-01-11 DIAGNOSIS — R52 PAIN MANAGEMENT: ICD-10-CM

## 2024-01-11 ASSESSMENT — PAIN DESCRIPTION - DESCRIPTORS: DESCRIPTORS: DULL;SHARP

## 2024-01-11 ASSESSMENT — PAIN - FUNCTIONAL ASSESSMENT: PAIN_FUNCTIONAL_ASSESSMENT: 0-10

## 2024-01-11 NOTE — PROGRESS NOTES
Procedure cancelled d/t not having appropriate documentation from previous SI joint injection from [place]. Dr. [place] spoke to patient and wife.  Release of information form signed and documentation will be obtained from office. Pt rescheduled for next week.

## 2024-01-11 NOTE — DISCHARGE INSTRUCTIONS
You have received a sedative/anesthetic therefore you should not consume any alcoholic beverages for 24 hours.  Do not drive or operate machinery for 24 hours.  Do not take a tub bath for 72 hours after procedure (this includes hot tubs).  You may shower, but avoid hot water to injection site.   Avoid strenuous activity TODAY especially if you experience dizziness.   Remove band-aid the next day.    Wash off any residual iodine 24 hours from today.   Do not use heat, heating pad, or any other heating device over the injection site for 3 days after the procedure.    If you experience pain after your procedure, you may continue with your current pain medication as prescribed.  (DO NOT INCREASE YOUR PAIN MEDICATION WITHOUT TALKING TO DOCTOR)  Soreness and pain at injection site is common, may use ice to reduce soreness.    What to expect:  You may experience facial flushing, night sweats and irritability.  Other common steroid related side effects are increased appetite, mood elevation, insomnia and fluid retention.  These effects usually subside in a few days.  Steroids used in epidural injections may cause muscle spasms for a few days.  If you are diabetic, your blood sugar may be elevated after your procedure due to the steroids.  You will need to monitor your blood sugar more closely while going through a series of injections (Check blood sugar at meals and bedtime for 5 days).  You may require adjustment in your diabetic medications, contact your PCP office to discuss.    Call [facility] at [phone] if you experience:   Fever, chills or temperature over 100    Vomiting, headache, persistent stiff neck, nausea or blurred vision   Difficulty urinating or unable to urinate within 8 hours   Increase in weakness, numbness or loss of function of limbs  Increased redness, swelling or drainage at the injection site

## 2024-01-25 ENCOUNTER — OFFICE VISIT (OUTPATIENT)
Dept: PAIN MANAGEMENT | Age: 79
End: 2024-01-25
Payer: MEDICARE

## 2024-01-25 VITALS — WEIGHT: 170 LBS | HEIGHT: 69 IN | BODY MASS INDEX: 25.18 KG/M2

## 2024-01-25 DIAGNOSIS — M46.1 SACROILIITIS (HCC): Primary | ICD-10-CM

## 2024-01-25 DIAGNOSIS — M47.817 LUMBOSACRAL SPONDYLOSIS WITHOUT MYELOPATHY: ICD-10-CM

## 2024-01-25 PROCEDURE — G8419 CALC BMI OUT NRM PARAM NOF/U: HCPCS | Performed by: NURSE PRACTITIONER

## 2024-01-25 PROCEDURE — 1036F TOBACCO NON-USER: CPT | Performed by: NURSE PRACTITIONER

## 2024-01-25 PROCEDURE — 99213 OFFICE O/P EST LOW 20 MIN: CPT | Performed by: NURSE PRACTITIONER

## 2024-01-25 PROCEDURE — 1123F ACP DISCUSS/DSCN MKR DOCD: CPT | Performed by: NURSE PRACTITIONER

## 2024-01-25 PROCEDURE — G8484 FLU IMMUNIZE NO ADMIN: HCPCS | Performed by: NURSE PRACTITIONER

## 2024-01-25 PROCEDURE — G8427 DOCREV CUR MEDS BY ELIG CLIN: HCPCS | Performed by: NURSE PRACTITIONER

## 2024-01-25 ASSESSMENT — ENCOUNTER SYMPTOMS
BACK PAIN: 1
CONSTIPATION: 0
COUGH: 0
SHORTNESS OF BREATH: 0

## 2024-01-25 NOTE — PROGRESS NOTES
narrowing.     L2-L3: There is a circumferential disc bulge.  There is no canal stenosis or  left foraminal narrowing.  There is mild right foraminal narrowing.     L3-L4: There is a circumferential disc bulge with facet hypertrophy.  There  is no canal stenosis.  There is moderate to severe foraminal narrowing.     L4-L5: There is a circumferential disc bulge with facet hypertrophy.  There  is no canal stenosis.  There is moderate foraminal narrowing.     L5-S1: There is a circumferential disc bulge with facet hypertrophy.  There  is no canal stenosis.  There is mild right and moderate to severe left  foraminal narrowing.     IMPRESSION:  Scoliotic curvature with multilevel degenerative change resulting in  foraminal narrowing bilaterally as described above.    Assessment:  Problem List Items Addressed This Visit       Lumbosacral spondylosis without myelopathy    Sacroiliitis (HCC) - Primary          Treatment Plan:  Notes from [facility] in [place] are scanned into media - reviewed and follow up from Right SI joint injection located on page 15  Will call and obtain actual op note from procedure in [place] as well  Will again attempt to get insurance approval for Right SI RFA      I have reviewed the chief complaint and history of present illness (including ROS and PFSH) and vital documentation by my staff and I agree with their documentation and have added where applicable.

## 2024-03-18 ENCOUNTER — TELEPHONE (OUTPATIENT)
Dept: PAIN MANAGEMENT | Age: 79
End: 2024-03-18

## 2024-03-22 ENCOUNTER — OFFICE VISIT (OUTPATIENT)
Dept: PAIN MANAGEMENT | Age: 79
End: 2024-03-22
Payer: OTHER GOVERNMENT

## 2024-03-22 VITALS — WEIGHT: 170 LBS | HEIGHT: 69 IN | BODY MASS INDEX: 25.18 KG/M2

## 2024-03-22 DIAGNOSIS — M47.817 LUMBOSACRAL SPONDYLOSIS WITHOUT MYELOPATHY: ICD-10-CM

## 2024-03-22 DIAGNOSIS — M51.36 DEGENERATION OF LUMBAR INTERVERTEBRAL DISC: ICD-10-CM

## 2024-03-22 DIAGNOSIS — M46.1 SACROILIITIS (HCC): Primary | ICD-10-CM

## 2024-03-22 PROCEDURE — 1123F ACP DISCUSS/DSCN MKR DOCD: CPT | Performed by: PAIN MEDICINE

## 2024-03-22 PROCEDURE — 99214 OFFICE O/P EST MOD 30 MIN: CPT | Performed by: PAIN MEDICINE

## 2024-03-22 RX ORDER — LISINOPRIL 5 MG/1
5 TABLET ORAL
COMMUNITY
Start: 2024-01-24

## 2024-03-22 ASSESSMENT — ENCOUNTER SYMPTOMS
BOWEL INCONTINENCE: 0
BACK PAIN: 1

## 2024-03-22 NOTE — PROGRESS NOTES
HPI:     Back Pain  This is a chronic problem. The current episode started more than 1 year ago. The problem occurs constantly. The problem is unchanged. The pain is present in the lumbar spine. The quality of the pain is described as aching. The pain radiates to the right foot. The pain is at a severity of 7/10. The pain is moderate. The pain is The same all the time. The symptoms are aggravated by twisting and position. Pertinent negatives include no bladder incontinence or bowel incontinence. Risk factors include history of cancer. He has tried home exercises, bed rest, walking, NSAIDs and analgesics for the symptoms.     Chronic low back pain.  MRI with degenerative changes and scoliosis.  He has seen a surgeon.  Nothing surgical planned at this time.  He has had history of back surgery in the past.  Was seeing pain management in [place].  Had injections in [place] with some benefit.  Did have a right SI joint injection in August with complete resolution of his pain for several weeks.  Has seen surgeon who did discuss potential spinal cord stimulation, patient is not interested.    Pain ranges from a 3/10 to a 8/10 depending on activity.    Patient denies any new neurological symptoms. Nobowel or bladder incontinence, no weakness, and no falling.    Review of OARRS does not show any aberrant prescription behavior.     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

## 2024-04-11 ENCOUNTER — HOSPITAL ENCOUNTER (OUTPATIENT)
Dept: PAIN MANAGEMENT | Facility: CLINIC | Age: 79
Discharge: HOME | End: 2024-04-11
Payer: OTHER GOVERNMENT

## 2024-04-11 VITALS
BODY MASS INDEX: 25.18 KG/M2 | OXYGEN SATURATION: 99 % | HEIGHT: 69 IN | SYSTOLIC BLOOD PRESSURE: 153 MMHG | WEIGHT: 170 LBS | HEART RATE: 82 BPM | RESPIRATION RATE: 10 BRPM | DIASTOLIC BLOOD PRESSURE: 70 MMHG | TEMPERATURE: 98.3 F

## 2024-04-11 DIAGNOSIS — R52 PAIN MANAGEMENT: ICD-10-CM

## 2024-04-11 PROCEDURE — G0260 INJ FOR SACROILIAC JT ANESTH: HCPCS

## 2024-04-11 PROCEDURE — 27096 INJECT SACROILIAC JOINT: CPT | Performed by: PAIN MEDICINE

## 2024-04-11 PROCEDURE — 6360000002 HC RX W HCPCS: Performed by: PAIN MEDICINE

## 2024-04-11 RX ORDER — DEXAMETHASONE SODIUM PHOSPHATE 10 MG/ML
INJECTION, SOLUTION INTRAMUSCULAR; INTRAVENOUS
Status: COMPLETED | OUTPATIENT
Start: 2024-04-11 | End: 2024-04-11

## 2024-04-11 RX ORDER — BUPIVACAINE HYDROCHLORIDE 2.5 MG/ML
INJECTION, SOLUTION EPIDURAL; INFILTRATION; INTRACAUDAL
Status: COMPLETED | OUTPATIENT
Start: 2024-04-11 | End: 2024-04-11

## 2024-04-11 RX ADMIN — BUPIVACAINE HYDROCHLORIDE 2.5 ML: 2.5 INJECTION, SOLUTION EPIDURAL; INFILTRATION; INTRACAUDAL; PERINEURAL at 16:40

## 2024-04-11 RX ADMIN — DEXAMETHASONE SODIUM PHOSPHATE 5 MG: 10 INJECTION, SOLUTION INTRAMUSCULAR; INTRAVENOUS at 16:40

## 2024-04-11 ASSESSMENT — PAIN - FUNCTIONAL ASSESSMENT
PAIN_FUNCTIONAL_ASSESSMENT: PREVENTS OR INTERFERES SOME ACTIVE ACTIVITIES AND ADLS
PAIN_FUNCTIONAL_ASSESSMENT: NONE - DENIES PAIN
PAIN_FUNCTIONAL_ASSESSMENT: 0-10

## 2024-04-11 ASSESSMENT — PAIN DESCRIPTION - DESCRIPTORS: DESCRIPTORS: ACHING;SHARP

## 2024-04-11 NOTE — DISCHARGE INSTRUCTIONS
You have received a sedative/anesthetic therefore you should not consume any alcoholic beverages for 24 hours.  Do not drive or operate machinery for 24 hours.  Do not take a tub bath for 72 hours after procedure (this includes hot tubs).  You may shower, but avoid hot water to injection site.   Avoid strenuous activity TODAY especially if you experience dizziness.   Remove band-aid the next day.    Wash off any residual iodine 24 hours from today.   Do not use heat, heating pad, or any other heating device over the injection site for 3 days after the procedure.    If you experience pain after your procedure, you may continue with your current pain medication as prescribed.  (DO NOT INCREASE YOUR PAIN MEDICATION WITHOUT TALKING TO DOCTOR)  Soreness and pain at injection site is common, may use ice to reduce soreness.    What to expect:  You may experience facial flushing, night sweats and irritability.  Other common steroid related side effects are increased appetite, mood elevation, insomnia and fluid retention.  These effects usually subside in a few days.  Steroids used in epidural injections may cause muscle spasms for a few days.  If you are diabetic, your blood sugar may be elevated after your procedure due to the steroids.  You will need to monitor your blood sugar more closely while going through a series of injections (Check blood sugar at meals and bedtime for 5 days).  You may require adjustment in your diabetic medications, contact your PCP office to discuss.    Call [facility] at [phone] if you experience:   Fever, chills or temperature over 100    Vomiting, headache, persistent stiff neck, nausea or blurred vision   Difficulty urinating or unable to urinate within 8 hours   Increase in weakness, numbness or loss of function of limbs  Increased redness, swelling or drainage at the injection site

## 2024-04-11 NOTE — OP NOTE
Sacro-Iliac Joint Injection:  SURGEON: Cassi Montana MD    PRE-OP DIAGNOSIS: Sacroiliitis (M46.1), Low back pain (M54.5)    POST-OP DIAGNOSIS: Same.    Procedure performed: Right Sacroiliac Joint Injection.    Physician confirmed and marked the surgical site.    EBL: minimal    CONSENT: Patient has undergone the educational process with this procedure, is aware and fully understands the risks involved: potential damage to any and all body organs including possible bleeding, infection, and nerve injury, allergic reaction and headache. Patient also understands that the procedure will be undertaken in a safe, controlled and monitored setting. Patient recognizes that the benefits may include relief from pain and reduction in the oral use of medications. Patient agreed to proceed.    The patient was counseled at length about the risks of sonido Covid-19 during their perioperative period and any recovery window from their procedure.  The patient was made aware that sonido Covid-19  may worsen their prognosis for recovering from their procedure  and lend to a higher morbidity and/or mortality risk.  All material risks, benefits, and reasonable alternatives including postponing the procedure were discussed. The patient does wish to proceed with the procedure at this time.      PREP: The patient's back was prepped with chloroprep and draped appropriately. 5ml of 0.5% lidocaine was used to anesthetize the skin and subcutaneous tissue.    PROCEDURE NOTE: 25 gauge spinal needle(s) was/were advanced to the  Right SI joint under fluoroscopic guidance. Aspiration was negative. 2ml of  0.25% bupivacaine was mixed with 5mg Dexamethasone and slowly injected into the joint(s).    The needle was withdrawn by the physician and the nurse applied a sterile dressing. The patient tolerated the procedure well. No complications occurred. Patient transferred to the recovery room in satisfactory condition. Appropriate written

## 2024-04-11 NOTE — H&P
Pain Pre-Op H&P Note    Cassi Montana MD    HPI: Leodan Randall  presents with pain over SI joint. Wishes to proceed with SI joint injection.    Past Medical History:   Diagnosis Date    Acute low back pain 02/05/2019    Agent orange exposure     Alcoholism (HCC)     recovery 1995    Anemia     Anxiety     Santos's esophagus     Bradycardia     says this is usual for him, says he is active-bikes 7 days a week     Cancer (HCC)     Prostate-  radiation complete 12/2010    Colon polyps     colonoscopy recomm q 5 y     Depression     Gastroesophageal reflux disease 9/14/2023    Hyperlipidemia     Hypertension     prior to wt loss     Lumbar radiculopathy     Osteoarthritis     PTSD (post-traumatic stress disorder)     Renal cyst, left 10/10/2018    Rib fracture 2016    mechanical trip ,fell into washing machine        Past Surgical History:   Procedure Laterality Date    COLONOSCOPY      JOINT REPLACEMENT  2018    LAMINECTOMY      C4 C5  1995    LUMBAR EPIDURAL STEROID INJECTION/CAUDAL  03/05/2019         SHOULDER SURGERY Right     replacement 2017     UPPER GASTROINTESTINAL ENDOSCOPY      UPPER GASTROINTESTINAL ENDOSCOPY N/A 08/29/2018    EGD BIOPSY performed by Tara Leiva MD at [facility]       Family History   Problem Relation Age of Onset    Cancer Mother         lung mets to brain     Cancer Brother         prostate        No Known Allergies      Current Outpatient Medications:     lisinopril (PRINIVIL;ZESTRIL) 5 MG tablet, 1 tablet, Disp: , Rfl:     diclofenac (VOLTAREN) 50 MG EC tablet, Take 1 tablet by mouth 2 times daily (with meals), Disp: 60 tablet, Rfl: 3    Omega-3 Fatty Acids (FISH OIL PO), Take by mouth, Disp: , Rfl:     polyethyl glycol-propyl glycol 0.4-0.3 % (SYSTANE) 0.4-0.3 % ophthalmic solution, INSTILL 1 DROP IN BOTH EYES EVERY 6 HOURS AS NEEDED FOR DRY EYE, Disp: , Rfl:     Calcium Carbonate-Vit D-Min (CALCIUM 1200 PO), , Disp: , Rfl:     esomeprazole Magnesium (NEXIUM) 20 MG PACK,

## 2024-04-26 ENCOUNTER — OFFICE VISIT (OUTPATIENT)
Dept: PAIN MANAGEMENT | Age: 79
End: 2024-04-26
Payer: OTHER GOVERNMENT

## 2024-04-26 VITALS — BODY MASS INDEX: 25.18 KG/M2 | WEIGHT: 170 LBS | HEIGHT: 69 IN

## 2024-04-26 DIAGNOSIS — M54.50 CHRONIC BILATERAL LOW BACK PAIN, UNSPECIFIED WHETHER SCIATICA PRESENT: ICD-10-CM

## 2024-04-26 DIAGNOSIS — M47.817 LUMBOSACRAL SPONDYLOSIS WITHOUT MYELOPATHY: Primary | ICD-10-CM

## 2024-04-26 DIAGNOSIS — M51.36 DEGENERATION OF LUMBAR INTERVERTEBRAL DISC: ICD-10-CM

## 2024-04-26 DIAGNOSIS — G89.29 CHRONIC BILATERAL LOW BACK PAIN, UNSPECIFIED WHETHER SCIATICA PRESENT: ICD-10-CM

## 2024-04-26 PROCEDURE — 99214 OFFICE O/P EST MOD 30 MIN: CPT | Performed by: PAIN MEDICINE

## 2024-04-26 PROCEDURE — 1123F ACP DISCUSS/DSCN MKR DOCD: CPT | Performed by: PAIN MEDICINE

## 2024-04-26 ASSESSMENT — ENCOUNTER SYMPTOMS
BOWEL INCONTINENCE: 0
BACK PAIN: 1

## 2024-04-26 NOTE — PROGRESS NOTES
HPI:     Back Pain  This is a chronic problem. The current episode started more than 1 year ago. The problem occurs constantly. The pain is present in the lumbar spine. The quality of the pain is described as aching and stabbing. The pain does not radiate. The pain is moderate. The pain is The same all the time. The symptoms are aggravated by position, standing and twisting. Pertinent negatives include no bladder incontinence or bowel incontinence. He has tried home exercises, bed rest, heat, ice, walking and NSAIDs for the symptoms.     Chronic right-sided back pain.  MRI with multilevel degenerative changes.  Has had previous lumbar decompression in the past.  He has seen a surgeon, nothing surgical planned.  Suggested spinal cord stimulation, he has not been interested.  Recent SI joint injection with minimal benefit.  X-ray with scoliosis.    Pain ranges from a 3/10 to a 8/10 depending on activity.    Patient denies any new neurological symptoms. Nobowel or bladder incontinence, no weakness, and no falling.    Review of OARRS does not show any aberrant prescription behavior.     Past Medical History:   Diagnosis Date    Acute low back pain 02/05/2019    Agent orange exposure     Alcoholism (HCC)     recovery 1995    Anemia     Anxiety     Santos's esophagus     Bradycardia     says this is usual for him, says he is active-bikes 7 days a week     Cancer ([facility])     Prostate-  radiation complete 12/2010    Colon polyps     colonoscopy recomm q 5 y     Depression     Gastroesophageal reflux disease 9/14/2023    Hyperlipidemia     Hypertension     prior to wt loss     Lumbar radiculopathy     Osteoarthritis     PTSD (post-traumatic stress disorder)     Renal cyst, left 10/10/2018    Rib fracture 2016    mechanical trip ,fell into washing machine        Past Surgical History:   Procedure Laterality Date    COLONOSCOPY      JOINT REPLACEMENT  2018    LAMINECTOMY      C4 C5  1995    LUMBAR EPIDURAL STEROID

## 2024-05-22 ENCOUNTER — TELEPHONE (OUTPATIENT)
Dept: PAIN MANAGEMENT | Age: 79
End: 2024-05-22

## 2024-05-22 NOTE — TELEPHONE ENCOUNTER
Patient called and had to reschedule procedure due to being hospitalized in [place] with Colitis. Patient procedures were cancelled and he will call to schedule an office visit when he returns.

## 2024-08-09 ENCOUNTER — OFFICE VISIT (OUTPATIENT)
Dept: PAIN MANAGEMENT | Age: 79
End: 2024-08-09
Payer: MEDICARE

## 2024-08-09 VITALS — BODY MASS INDEX: 25.18 KG/M2 | WEIGHT: 170 LBS | HEIGHT: 69 IN

## 2024-08-09 DIAGNOSIS — M47.817 LUMBOSACRAL SPONDYLOSIS WITHOUT MYELOPATHY: Primary | ICD-10-CM

## 2024-08-09 DIAGNOSIS — M41.9 SCOLIOSIS, UNSPECIFIED SCOLIOSIS TYPE, UNSPECIFIED SPINAL REGION: ICD-10-CM

## 2024-08-09 PROCEDURE — 1123F ACP DISCUSS/DSCN MKR DOCD: CPT | Performed by: PAIN MEDICINE

## 2024-08-09 PROCEDURE — 99213 OFFICE O/P EST LOW 20 MIN: CPT | Performed by: PAIN MEDICINE

## 2024-08-09 RX ORDER — FINASTERIDE 5 MG/1
5 TABLET, FILM COATED ORAL
COMMUNITY
Start: 2024-06-27

## 2024-08-09 RX ORDER — TAMSULOSIN HYDROCHLORIDE 0.4 MG/1
CAPSULE ORAL
COMMUNITY
Start: 2024-05-17

## 2024-08-09 ASSESSMENT — ENCOUNTER SYMPTOMS
BACK PAIN: 1
BOWEL INCONTINENCE: 0

## 2024-08-09 NOTE — PROGRESS NOTES
prostate        Social History     Socioeconomic History    Marital status:      Spouse name: Not on file    Number of children: Not on file    Years of education: Not on file    Highest education level: Not on file   Occupational History    Not on file   Tobacco Use    Smoking status: Former     Current packs/day: 0.00     Average packs/day: 1 pack/day for 50.0 years (50.0 ttl pk-yrs)     Types: Cigarettes     Start date: 9/17/1958     Quit date: 9/17/2008     Years since quitting: 15.9    Smokeless tobacco: Never    Tobacco comments:     on and off again, quit most recently two months ago. generally smoke as a strees relief   Vaping Use    Vaping Use: Never used   Substance and Sexual Activity    Alcohol use: No     Comment: quit 1990s ; prev qd drinking     Drug use: No    Sexual activity: Not Currently     Partners: Female   Other Topics Concern    Not on file   Social History Narrative    Not on file     Social Determinants of Health     Financial Resource Strain: Low Risk  (5/30/2023)    Overall Financial Resource Strain (CARDIA)     Difficulty of Paying Living Expenses: Not hard at all   Food Insecurity: Not on file (5/30/2023)   Transportation Needs: Unknown (5/30/2023)    PRAPARE - Transportation     Lack of Transportation (Medical): Not on file     Lack of Transportation (Non-Medical): No   Physical Activity: Sufficiently Active (8/30/2023)    Exercise Vital Sign     Days of Exercise per Week: 7 days     Minutes of Exercise per Session: 90 min   Stress: Not on file   Social Connections: Not on file   Intimate Partner Violence: Not on file   Housing Stability: Unknown (5/30/2023)    Housing Stability Vital Sign     Unable to Pay for Housing in the Last Year: Not on file     Number of Places Lived in the Last Year: Not on file     Unstable Housing in the Last Year: No       Review of Systems:  Review of Systems   Musculoskeletal:  Positive for back pain.   Gastrointestinal:  Negative for bowel

## 2024-08-19 ENCOUNTER — PATIENT MESSAGE (OUTPATIENT)
Dept: PAIN MANAGEMENT | Age: 79
End: 2024-08-19

## 2024-08-19 DIAGNOSIS — M47.817 LUMBOSACRAL SPONDYLOSIS WITHOUT MYELOPATHY: Primary | ICD-10-CM

## 2024-10-17 ENCOUNTER — HOSPITAL ENCOUNTER (OUTPATIENT)
Dept: PAIN MANAGEMENT | Facility: CLINIC | Age: 79
Discharge: HOME | End: 2024-10-17
Payer: OTHER GOVERNMENT

## 2024-10-17 VITALS
HEIGHT: 70 IN | TEMPERATURE: 98.2 F | HEART RATE: 63 BPM | RESPIRATION RATE: 15 BRPM | DIASTOLIC BLOOD PRESSURE: 73 MMHG | BODY MASS INDEX: 22.9 KG/M2 | OXYGEN SATURATION: 99 % | WEIGHT: 160 LBS | SYSTOLIC BLOOD PRESSURE: 145 MMHG

## 2024-10-17 DIAGNOSIS — R52 PAIN MANAGEMENT: ICD-10-CM

## 2024-10-17 PROCEDURE — 6360000002 HC RX W HCPCS: Performed by: PAIN MEDICINE

## 2024-10-17 PROCEDURE — 2500000003 HC RX 250 WO HCPCS: Performed by: PAIN MEDICINE

## 2024-10-17 PROCEDURE — 64493 INJ PARAVERT F JNT L/S 1 LEV: CPT | Performed by: PAIN MEDICINE

## 2024-10-17 PROCEDURE — 64493 INJ PARAVERT F JNT L/S 1 LEV: CPT

## 2024-10-17 RX ORDER — LIDOCAINE HYDROCHLORIDE 5 MG/ML
INJECTION, SOLUTION INFILTRATION; INTRAVENOUS
Status: COMPLETED | OUTPATIENT
Start: 2024-10-17 | End: 2024-10-17

## 2024-10-17 RX ORDER — BUPIVACAINE HYDROCHLORIDE 2.5 MG/ML
INJECTION, SOLUTION EPIDURAL; INFILTRATION; INTRACAUDAL
Status: COMPLETED | OUTPATIENT
Start: 2024-10-17 | End: 2024-10-17

## 2024-10-17 RX ADMIN — BUPIVACAINE HYDROCHLORIDE 3 ML: 2.5 INJECTION, SOLUTION EPIDURAL; INFILTRATION; INTRACAUDAL; PERINEURAL at 13:58

## 2024-10-17 RX ADMIN — LIDOCAINE HYDROCHLORIDE 4 ML: 5 INJECTION, SOLUTION INFILTRATION at 13:56

## 2024-10-17 ASSESSMENT — PAIN DESCRIPTION - DESCRIPTORS: DESCRIPTORS: SHARP

## 2024-10-17 ASSESSMENT — PAIN SCALES - GENERAL: PAINLEVEL_OUTOF10: 0

## 2024-10-17 NOTE — OP NOTE
Lumbar Facet Nerve Block Injection:  Surgeon: Cassi Montana MD     PRE-OP DIAGNOSIS: M47.817 (lumbosacral spondylosis), M54.5 (low back pain)    POST-OP DIAGNOSIS: Same.    PROCEDURE PERFORMED: Lumbar Facet Nerve Block Levels:  Right L5-S1.     Physician confirmed and marked the surgical site.    EBL: minimal      CONSENT: Patient has undergone the educational process with this procedure, is aware and fully understands the risks involved: potential damage to any and all body organs including possible bleeding, infection and nerve injury, allergic reaction and headache. Patient also understands that the procedure will be undertaken in a safe, controlled, and monitored setting. Patient recognizes that the benefits include relief from pain and reduction in the oral use of medications. Patient agreed to proceed.    Risks, benefits, and alternatives including postponing the procedure were discussed. The patient does wish to proceed with the procedure at this time.      PREP: Timeout was performed prior to starting the procedure. The patient's back was prepped with chloroprep and draped appropriately. 0.5% lidocaine was used to anesthetize the skin and subcutaneous tissue.     PROCEDURE NOTE: 25 gauge spinal needles were advanced under  fluoroscopic guidance to the appropriate anatomic location for the medial branches and/or dorsal ramus corresponding to the indicated facet joints. Aspiration was negative. 1 ml of 0.25% marcaine was then injected at each site to block medial branch nerve innervating the  Right L5-S1 facet joint.    Did not find a good target at L4 due to patient's anatomy.  Decision made to proceed with medial branch block tarting L5-S1 facet only, did discuss this possibility with the patient in the office as well as preoperative area.  Patient did wish to proceed with L5-S1.    Patient tolerated the procedure well, no complications occurred.    At the end of the injection the physician withdrew the

## 2024-10-17 NOTE — H&P
Pain Pre-Op H&P Note    Cassi Montana MD    HPI: Leodan Randall  presents with back pain.  Wishes to proceed with medial branch block.    Past Medical History:   Diagnosis Date    Acute low back pain 02/05/2019    Agent orange exposure     Alcoholism (HCC)     recovery 1995    Anemia     Anxiety     Santos's esophagus     Bradycardia     says this is usual for him, says he is active-bikes 7 days a week     Cancer (HCC)     Prostate-  radiation complete 12/2010    Colon polyps     colonoscopy recomm q 5 y     Depression     Gastroesophageal reflux disease 9/14/2023    Hyperlipidemia     Hypertension     prior to wt loss     Lumbar radiculopathy     Osteoarthritis     PTSD (post-traumatic stress disorder)     Renal cyst, left 10/10/2018    Rib fracture 2016    mechanical trip ,fell into washing machine        Past Surgical History:   Procedure Laterality Date    COLONOSCOPY      JOINT REPLACEMENT  2018    LAMINECTOMY      C4 C5  1995    LUMBAR EPIDURAL STEROID INJECTION/CAUDAL  03/05/2019         SHOULDER SURGERY Right     replacement 2017     UPPER GASTROINTESTINAL ENDOSCOPY      UPPER GASTROINTESTINAL ENDOSCOPY N/A 08/29/2018    EGD BIOPSY performed by Tara eLiva MD at [facility]       Family History   Problem Relation Age of Onset    Cancer Mother         lung mets to brain     Cancer Brother         prostate        No Known Allergies      Current Outpatient Medications:     finasteride (PROSCAR) 5 MG tablet, Take 1 tablet by mouth, Disp: , Rfl:     tamsulosin (FLOMAX) 0.4 MG capsule, TAKE ONE CAPSULE BY MOUTH ONE TIME DAILY AT BEDTIME, Disp: , Rfl:     lisinopril (PRINIVIL;ZESTRIL) 5 MG tablet, 1 tablet, Disp: , Rfl:     Omega-3 Fatty Acids (FISH OIL PO), Take by mouth, Disp: , Rfl:     polyethyl glycol-propyl glycol 0.4-0.3 % (SYSTANE) 0.4-0.3 % ophthalmic solution, INSTILL 1 DROP IN BOTH EYES EVERY 6 HOURS AS NEEDED FOR DRY EYE, Disp: , Rfl:     Calcium Carbonate-Vit D-Min (CALCIUM 1200 PO), , Disp:

## 2024-10-17 NOTE — DISCHARGE INSTRUCTIONS
You have received a sedative/anesthetic therefore you should not consume any alcoholic beverages for 24 hours.  Do not drive or operate machinery for 24 hours.   Do not take a tub bath for 72 hours after procedure (this includes hot tubs).  You may shower, but avoid hot water to injection site.   Avoid strenuous activity TODAY especially if you experience dizziness.   Remove band-aid the next day.    Wash off any residual iodine 24 hours from today.   Do not use heat, heating pad, or any other heating device over the injection site for 3 days after the procedure.    If you experience pain after your procedure, you may continue with your current pain medication as prescribed.  (DO NOT INCREASE YOUR PAIN MEDICATION WITHOUT TALKING TO DOCTOR)  Soreness and pain at injection site is common, may use ice to reduce soreness.    Please complete pain diary as instructed. The office staff will call you to discuss the results of the procedure within 24 hours, please call the office if you do not hear from them.      Call [facility] at [phone] if you experience:   Fever, chills or temperature over 100    Vomiting, headache, persistent stiff neck, nausea or blurred vision   Difficulty urinating or unable to urinate within 8 hours   Increase in weakness, numbness or loss of function of limbs  Increased redness, swelling or drainage at the injection site

## 2024-10-18 ENCOUNTER — TELEPHONE (OUTPATIENT)
Dept: PAIN MANAGEMENT | Age: 79
End: 2024-10-18

## 2024-10-18 NOTE — TELEPHONE ENCOUNTER
S/P: Lumbar Facet Nerve Block Levels:  Right L5-S1.       DOS: 10/17/24    Pain before procedure with activity :  8    Pain after procedure with activity: 2    Activities following procedure include: Walked around and did house work     % of pain relief: 80%    Procedure successful: Yes    OR scheduled: 10/31/24

## 2024-10-31 ENCOUNTER — HOSPITAL ENCOUNTER (OUTPATIENT)
Dept: PAIN MANAGEMENT | Facility: CLINIC | Age: 79
Discharge: HOME | End: 2024-10-31
Payer: OTHER GOVERNMENT

## 2024-10-31 VITALS
HEIGHT: 70 IN | TEMPERATURE: 98.3 F | OXYGEN SATURATION: 99 % | RESPIRATION RATE: 15 BRPM | HEART RATE: 42 BPM | BODY MASS INDEX: 22.9 KG/M2 | SYSTOLIC BLOOD PRESSURE: 139 MMHG | WEIGHT: 160 LBS | DIASTOLIC BLOOD PRESSURE: 63 MMHG

## 2024-10-31 DIAGNOSIS — R52 PAIN MANAGEMENT: ICD-10-CM

## 2024-10-31 PROCEDURE — 2500000003 HC RX 250 WO HCPCS: Performed by: PAIN MEDICINE

## 2024-10-31 PROCEDURE — 6360000002 HC RX W HCPCS: Performed by: PAIN MEDICINE

## 2024-10-31 PROCEDURE — 64493 INJ PARAVERT F JNT L/S 1 LEV: CPT

## 2024-10-31 RX ORDER — BUPIVACAINE HYDROCHLORIDE 2.5 MG/ML
INJECTION, SOLUTION EPIDURAL; INFILTRATION; INTRACAUDAL
Status: COMPLETED | OUTPATIENT
Start: 2024-10-31 | End: 2024-10-31

## 2024-10-31 RX ORDER — LIDOCAINE HYDROCHLORIDE 5 MG/ML
INJECTION, SOLUTION INFILTRATION; INTRAVENOUS
Status: COMPLETED | OUTPATIENT
Start: 2024-10-31 | End: 2024-10-31

## 2024-10-31 RX ADMIN — LIDOCAINE HYDROCHLORIDE 3 ML: 5 INJECTION, SOLUTION INFILTRATION at 13:13

## 2024-10-31 RX ADMIN — BUPIVACAINE HYDROCHLORIDE 5 ML: 2.5 INJECTION, SOLUTION EPIDURAL; INFILTRATION; INTRACAUDAL; PERINEURAL at 13:16

## 2024-10-31 ASSESSMENT — PAIN DESCRIPTION - DESCRIPTORS: DESCRIPTORS: DULL

## 2024-10-31 ASSESSMENT — PAIN - FUNCTIONAL ASSESSMENT
PAIN_FUNCTIONAL_ASSESSMENT: 0-10
PAIN_FUNCTIONAL_ASSESSMENT: PREVENTS OR INTERFERES SOME ACTIVE ACTIVITIES AND ADLS
PAIN_FUNCTIONAL_ASSESSMENT: NONE - DENIES PAIN

## 2024-10-31 NOTE — OP NOTE
Lumbar Facet Nerve Block Injection:  Surgeon: Cassi Montana MD     PRE-OP DIAGNOSIS: M47.817 (lumbosacral spondylosis), M54.5 (low back pain)    POST-OP DIAGNOSIS: Same.    PROCEDURE PERFORMED: Lumbar Facet Nerve Block Multiple Levels  Right L5 - S1.     Physician confirmed and marked the surgical site.    EBL: minimal      CONSENT: Patient has undergone the educational process with this procedure, is aware and fully understands the risks involved: potential damage to any and all body organs including possible bleeding, infection and nerve injury, allergic reaction and headache. Patient also understands that the procedure will be undertaken in a safe, controlled, and monitored setting. Patient recognizes that the benefits include relief from pain and reduction in the oral use of medications. Patient agreed to proceed.    Risks, benefits, and alternatives including postponing the procedure were discussed. The patient does wish to proceed with the procedure at this time.      PREP: Timeout was performed prior to starting the procedure. The patient's back was prepped with chloroprep and draped appropriately. 0.5% lidocaine was used to anesthetize the skin and subcutaneous tissue.     PROCEDURE NOTE: 25 gauge spinal needles were advanced under  fluoroscopic guidance to the appropriate anatomic location for the medial branches and/or dorsal ramus corresponding to the indicated facet joints. Aspiration was negative. 1 ml of 0.25% marcaine was then injected at each site to block medial branch nerve innervating the  Right L5 - S1 facet joints.    Patient tolerated the procedure well, no complications occurred.    At the end of the injection the physician withdrew the needle and the nurse applied a sterile bandage to the site. Patient transferred to the recovery room in satisfactory condition. Appropriate written discharge instructions were given to the patient. If good results are obtained, Patient would be a candidate

## 2024-10-31 NOTE — DISCHARGE INSTRUCTIONS
You have received a sedative/anesthetic therefore you should not consume any alcoholic beverages for 24 hours.  Do not drive or operate machinery for 24 hours.   Do not take a tub bath for 72 hours after procedure (this includes hot tubs).  You may shower, but avoid hot water to injection site.   Avoid strenuous activity TODAY especially if you experience dizziness.   Remove band-aid the next day.    Wash off any residual iodine 24 hours from today.   Do not use heat, heating pad, or any other heating device over the injection site for 3 days after the procedure.    If you experience pain after your procedure, you may continue with your current pain medication as prescribed.  (DO NOT INCREASE YOUR PAIN MEDICATION WITHOUT TALKING TO DOCTOR)  Soreness and pain at injection site is common, may use ice to reduce soreness.    Please complete pain diary as instructed. The office staff will call you to discuss the results of the procedure within 24 hours, please call the office if you do not hear from them.      Call [facility] at [phone] if you experience:   Fever, chills or temperature over 100    Vomiting, headache, persistent stiff neck, nausea or blurred vision   Difficulty urinating or unable to urinate within 8 hours   Increase in weakness, numbness or loss of function of limbs  Increased redness, swelling or drainage at the injection site

## 2024-10-31 NOTE — H&P
Pain Pre-Op H&P Note    Cassi Montana MD    HPI: Leodan Randall  presents with back pain.  Reports good temporary benefit with medial branch block x 1.    Past Medical History:   Diagnosis Date    Acute low back pain 02/05/2019    Agent orange exposure     Alcoholism (HCC)     recovery 1995    Anemia     Anxiety     Santos's esophagus     Bradycardia     says this is usual for him, says he is active-bikes 7 days a week     Cancer (HCC)     Prostate-  radiation complete 12/2010    Colon polyps     colonoscopy recomm q 5 y     Depression     Gastroesophageal reflux disease 9/14/2023    Hyperlipidemia     Hypertension     prior to wt loss     Lumbar radiculopathy     Osteoarthritis     PTSD (post-traumatic stress disorder)     Renal cyst, left 10/10/2018    Rib fracture 2016    mechanical trip ,fell into washing machine        Past Surgical History:   Procedure Laterality Date    COLONOSCOPY      JOINT REPLACEMENT  2018    LAMINECTOMY      C4 C5  1995    LUMBAR EPIDURAL STEROID INJECTION/CAUDAL  03/05/2019         SHOULDER SURGERY Right     replacement 2017     UPPER GASTROINTESTINAL ENDOSCOPY      UPPER GASTROINTESTINAL ENDOSCOPY N/A 08/29/2018    EGD BIOPSY performed by Tara Leiva MD at [facility]       Family History   Problem Relation Age of Onset    Cancer Mother         lung mets to brain     Cancer Brother         prostate        No Known Allergies      Current Outpatient Medications:     finasteride (PROSCAR) 5 MG tablet, Take 1 tablet by mouth, Disp: , Rfl:     tamsulosin (FLOMAX) 0.4 MG capsule, TAKE ONE CAPSULE BY MOUTH ONE TIME DAILY AT BEDTIME, Disp: , Rfl:     lisinopril (PRINIVIL;ZESTRIL) 5 MG tablet, 1 tablet, Disp: , Rfl:     Omega-3 Fatty Acids (FISH OIL PO), Take by mouth, Disp: , Rfl:     polyethyl glycol-propyl glycol 0.4-0.3 % (SYSTANE) 0.4-0.3 % ophthalmic solution, INSTILL 1 DROP IN BOTH EYES EVERY 6 HOURS AS NEEDED FOR DRY EYE, Disp: , Rfl:     Calcium Carbonate-Vit D-Min (CALCIUM

## 2024-11-01 ENCOUNTER — TELEPHONE (OUTPATIENT)
Dept: PAIN MANAGEMENT | Age: 79
End: 2024-11-01

## 2024-11-07 ENCOUNTER — OFFICE VISIT (OUTPATIENT)
Dept: PAIN MANAGEMENT | Age: 79
End: 2024-11-07
Payer: OTHER GOVERNMENT

## 2024-11-07 VITALS — WEIGHT: 160 LBS | BODY MASS INDEX: 22.9 KG/M2 | HEIGHT: 70 IN

## 2024-11-07 DIAGNOSIS — M47.817 LUMBOSACRAL SPONDYLOSIS WITHOUT MYELOPATHY: Primary | ICD-10-CM

## 2024-11-07 DIAGNOSIS — M54.17 LUMBOSACRAL NEURITIS: ICD-10-CM

## 2024-11-07 PROCEDURE — 99213 OFFICE O/P EST LOW 20 MIN: CPT | Performed by: NURSE PRACTITIONER

## 2024-11-07 PROCEDURE — 1123F ACP DISCUSS/DSCN MKR DOCD: CPT | Performed by: NURSE PRACTITIONER

## 2024-11-07 ASSESSMENT — ENCOUNTER SYMPTOMS
COUGH: 0
SHORTNESS OF BREATH: 0
CONSTIPATION: 0
BACK PAIN: 1

## 2024-11-07 NOTE — PROGRESS NOTES
Chief Complaint   Patient presents with    Back Pain    Follow Up After Procedure     Lumbar Facet Nerve Block Multiple Levels  Right L5 - S1.          [facility]  Pt is here for follow up after Right lumbar MBB x2. He had 80% relief with first MBB and 70% relief with confirmatory block.   Right SI joint injection 4/2024 with minimal benefit   He has seen a surgeon with nothing surgical planned  Discussed LESI with Dr. Montana and he does not feel he is an ideal candidate for this due to scoliosis  Dr. Montana recommends pt see Dr. Taylor to discuss PNS. Pt is agreeable to this.     Back Pain  This is a chronic problem. The current episode started more than 1 year ago. The problem occurs intermittently. The problem is unchanged. The pain is present in the lumbar spine. The quality of the pain is described as aching. The pain radiates to the right foot. The pain is at a severity of 1/10 (ranges 1/10 to 5/10 with certian activities). The pain is mild. The pain is Worse during the day. The symptoms are aggravated by bending, position, standing and twisting. Stiffness is present In the morning. Pertinent negatives include no chest pain or fever. Risk factors include history of cancer. He has tried home exercises, bed rest, walking and analgesics for the symptoms.     Patient denies any new neurological symptoms. No bowel or bladder incontinence, no weakness, and no falling.    Past Medical History:   Diagnosis Date    Acute low back pain 02/05/2019    Agent orange exposure     Alcoholism (HCC)     recovery 1995    Anemia     Anxiety     Santos's esophagus     Bradycardia     says this is usual for him, says he is active-bikes 7 days a week     Cancer (HCC)     Prostate-  radiation complete 12/2010    Colon polyps     colonoscopy recomm q 5 y     Depression     Gastroesophageal reflux disease 9/14/2023    Hyperlipidemia     Hypertension     prior to wt loss     Lumbar radiculopathy     Osteoarthritis     PTSD

## 2024-12-20 ENCOUNTER — OFFICE VISIT (OUTPATIENT)
Dept: PAIN MANAGEMENT | Age: 79
End: 2024-12-20
Payer: OTHER GOVERNMENT

## 2024-12-20 VITALS — WEIGHT: 160 LBS | BODY MASS INDEX: 22.9 KG/M2 | HEIGHT: 70 IN

## 2024-12-20 DIAGNOSIS — M51.369 DEGENERATION OF INTERVERTEBRAL DISC OF LUMBAR REGION, UNSPECIFIED WHETHER PAIN PRESENT: ICD-10-CM

## 2024-12-20 DIAGNOSIS — M47.817 LUMBOSACRAL SPONDYLOSIS WITHOUT MYELOPATHY: Primary | ICD-10-CM

## 2024-12-20 DIAGNOSIS — M79.18 MYOFASCIAL PAIN SYNDROME: ICD-10-CM

## 2024-12-20 PROCEDURE — 99215 OFFICE O/P EST HI 40 MIN: CPT | Performed by: STUDENT IN AN ORGANIZED HEALTH CARE EDUCATION/TRAINING PROGRAM

## 2024-12-20 PROCEDURE — 1123F ACP DISCUSS/DSCN MKR DOCD: CPT | Performed by: STUDENT IN AN ORGANIZED HEALTH CARE EDUCATION/TRAINING PROGRAM

## 2024-12-20 NOTE — PROGRESS NOTES
nonantalgic  Sensation to light touch is grossly intact in lower extremities  Strength is grossly intact in lower extremities  No ankle clonus    Special Tests:  Lumbar facet loading is positive on right  Seated straight leg raise is negative bilaterally      DIAGNOSIS:    ICD-10-CM    1. Lumbosacral spondylosis without myelopathy  M47.817 FACET JOINT L/S SINGLE      2. Degeneration of intervertebral disc of lumbar region, unspecified whether pain present  M51.369       3. Myofascial pain syndrome  M79.18            ASSESSMENT:    Leodan Randall is a 79 y.o.male who presents with chronic low back pain. To review, patient has had symptoms for the last year without recent injury or trauma.  He has not had lumbar spine surgery.    The patient's history and physical examination are consistent with lumbosacral spondylosis as the patient has axial low back pain that is mechanical in nature. There is tenderness to palpation along the lumbar paraspinal musculature with positive lumbar facet loading bilaterally.  The lumbar MRI on 6/6/2023 reveals multilevel degenerative changes with facet hypertrophy throughout the lumbar spine specifically at L5-S1 facet joints.  He underwent a right lumbar L4, L5 medial branch block on 10/17/2024 and 10/31/2024 with 80% improvement in pain and function.  Therefore, the patient is a candidate for the lumbar medial branch radiofrequency ablation using fluoroscopy.  I discussed risks, benefits and expectations the procedure and the patient agreed to proceed.    Neurologically, it appears the patient has full strength and normal sensation. There is no evidence of radiculopathy or myelopathy on examination. There are no red flags in the patient's history. The patient has failed conservative measures including outpatient physical therapy, greater than 3 medications for pain relief, a self-directed therapy program, as well as activity modification all within the last 6 weeks over 3 months. The

## 2025-01-21 ENCOUNTER — TELEPHONE (OUTPATIENT)
Dept: PAIN MANAGEMENT | Age: 80
End: 2025-01-21

## 2025-01-21 NOTE — TELEPHONE ENCOUNTER
Pt called to question why his procedure has gotten cancelled twice due to needing a referral. He states his current referral is active until 2/5/25. Please advise.

## 2025-02-28 ENCOUNTER — OFFICE VISIT (OUTPATIENT)
Dept: PAIN MANAGEMENT | Age: 80
End: 2025-02-28
Payer: OTHER GOVERNMENT

## 2025-02-28 VITALS — HEIGHT: 70 IN | BODY MASS INDEX: 22.9 KG/M2 | WEIGHT: 160 LBS

## 2025-02-28 DIAGNOSIS — M79.18 MYOFASCIAL PAIN SYNDROME: ICD-10-CM

## 2025-02-28 DIAGNOSIS — M51.369 DEGENERATION OF INTERVERTEBRAL DISC OF LUMBAR REGION, UNSPECIFIED WHETHER PAIN PRESENT: ICD-10-CM

## 2025-02-28 DIAGNOSIS — M47.817 LUMBOSACRAL SPONDYLOSIS WITHOUT MYELOPATHY: Primary | ICD-10-CM

## 2025-02-28 PROCEDURE — 1123F ACP DISCUSS/DSCN MKR DOCD: CPT | Performed by: STUDENT IN AN ORGANIZED HEALTH CARE EDUCATION/TRAINING PROGRAM

## 2025-02-28 PROCEDURE — 99214 OFFICE O/P EST MOD 30 MIN: CPT | Performed by: STUDENT IN AN ORGANIZED HEALTH CARE EDUCATION/TRAINING PROGRAM

## 2025-02-28 NOTE — PROGRESS NOTES
Chronic Pain Clinic Note     Encounter Date: 2/28/2025     SUBJECTIVE:  Chief Complaint   Patient presents with    Back Pain     Discuss options       History of Present Illness:   Leodan Randall is a 79 y.o. male who presents with back pain    Medication Refill: n/a    Current Complaints of Pain:   Location: low back   Radiation: no  Severity: moderate  Pain Numerical Score - 4 today    Average: 3/4     Highest: 8  Lowest: 1  Character/Quality: Complains of pain that is aching and sharp, tingling  Timing: Constant  Associated symptoms: none  Numbness: no  Weakness: no  Exacerbating factors: walking  Alleviating factors: rest, sitting down, lying down   Length of time pain has been present: Started about 2017  Inciting event/injury: no  Bowel/Bladder incontinence: no  Falls: no  Physical Therapy: yes    History of Interventions:   Surgery: yes, 2  Injections: 6 injection    Imaging:    MRI Lumbar spine 6/6/23    Past Medical History:   Diagnosis Date    Acute low back pain 02/05/2019    Agent orange exposure     Alcoholism (HCC)     recovery 1995    Anemia     Anxiety     Santos's esophagus     Bradycardia     says this is usual for him, says he is active-bikes 7 days a week     Cancer (HCC)     Prostate-  radiation complete 12/2010    Colon polyps     colonoscopy recomm q 5 y     Depression     Gastroesophageal reflux disease 9/14/2023    Hyperlipidemia     Hypertension     prior to wt loss     Lumbar radiculopathy     Osteoarthritis     PTSD (post-traumatic stress disorder)     Renal cyst, left 10/10/2018    Rib fracture 2016    mechanical trip ,fell into washing machine        Past Surgical History:   Procedure Laterality Date    COLONOSCOPY      JOINT REPLACEMENT  2018    LAMINECTOMY      C4 C5  1995    LUMBAR EPIDURAL STEROID INJECTION/CAUDAL  03/05/2019         SHOULDER SURGERY Right     replacement 2017     UPPER GASTROINTESTINAL ENDOSCOPY      UPPER GASTROINTESTINAL ENDOSCOPY N/A 08/29/2018    EGD BIOPSY

## 2025-02-28 NOTE — H&P (VIEW-ONLY)
to severe that causes functional deficit measured on pain and disability scale. The patient reports worsening quality of life.    PLAN:  Medications:    For nonopioid therapy, the following medications were prescribed:    -Continue medication prescribed by primary care physician    Opioid therapy:  -Not indicated    Interventions:  -Plan for right lumbar L4, L5 medial branch radiofrequency ablation    Imaging:  -Reviewed lumbar MRI with patient in room    Behavioral Therapies:  -Continue daily stretching and home exercise program    Referrals:  -None    Follow-up Plan:  -After procedure    Patient was offered intervention where appropriate.     Multi-modal Pain Therapy:  The patient was explicitly considered for multimodal and interdisciplinary therapy. Non-opioid and non-pharmacological opportunities to enhance analgesia and quality of life have been and will continue to be pursued.    Brian Taylor DO  Interventional Pain Management/PM&R   [facility]    No orders of the defined types were placed in this encounter.

## 2025-03-18 ENCOUNTER — HOSPITAL ENCOUNTER (OUTPATIENT)
Dept: PAIN MANAGEMENT | Facility: CLINIC | Age: 80
Discharge: HOME | End: 2025-03-18
Payer: OTHER GOVERNMENT

## 2025-03-18 VITALS
DIASTOLIC BLOOD PRESSURE: 65 MMHG | OXYGEN SATURATION: 99 % | SYSTOLIC BLOOD PRESSURE: 147 MMHG | HEART RATE: 55 BPM | RESPIRATION RATE: 15 BRPM | WEIGHT: 170 LBS | TEMPERATURE: 98.4 F | BODY MASS INDEX: 24.34 KG/M2 | HEIGHT: 70 IN

## 2025-03-18 DIAGNOSIS — R52 PAIN MANAGEMENT: ICD-10-CM

## 2025-03-18 PROCEDURE — 6360000002 HC RX W HCPCS: Performed by: STUDENT IN AN ORGANIZED HEALTH CARE EDUCATION/TRAINING PROGRAM

## 2025-03-18 PROCEDURE — 64635 DESTROY LUMB/SAC FACET JNT: CPT

## 2025-03-18 RX ORDER — TRIAMCINOLONE ACETONIDE 40 MG/ML
INJECTION, SUSPENSION INTRA-ARTICULAR; INTRAMUSCULAR
Status: COMPLETED | OUTPATIENT
Start: 2025-03-18 | End: 2025-03-18

## 2025-03-18 RX ORDER — LIDOCAINE HYDROCHLORIDE 10 MG/ML
INJECTION, SOLUTION EPIDURAL; INFILTRATION; INTRACAUDAL; PERINEURAL
Status: COMPLETED | OUTPATIENT
Start: 2025-03-18 | End: 2025-03-18

## 2025-03-18 RX ORDER — LIDOCAINE HYDROCHLORIDE 20 MG/ML
INJECTION, SOLUTION EPIDURAL; INFILTRATION; INTRACAUDAL; PERINEURAL
Status: COMPLETED | OUTPATIENT
Start: 2025-03-18 | End: 2025-03-18

## 2025-03-18 RX ADMIN — LIDOCAINE HYDROCHLORIDE 5 ML: 20 INJECTION, SOLUTION EPIDURAL; INFILTRATION; INTRACAUDAL; PERINEURAL at 13:22

## 2025-03-18 RX ADMIN — LIDOCAINE HYDROCHLORIDE 2 ML: 10 INJECTION, SOLUTION EPIDURAL; INFILTRATION; INTRACAUDAL; PERINEURAL at 13:25

## 2025-03-18 RX ADMIN — TRIAMCINOLONE ACETONIDE 40 MG: 40 INJECTION, SUSPENSION INTRA-ARTICULAR; INTRAMUSCULAR at 13:25

## 2025-03-18 ASSESSMENT — PAIN - FUNCTIONAL ASSESSMENT
PAIN_FUNCTIONAL_ASSESSMENT: 0-10
PAIN_FUNCTIONAL_ASSESSMENT: PREVENTS OR INTERFERES SOME ACTIVE ACTIVITIES AND ADLS

## 2025-03-18 ASSESSMENT — PAIN SCALES - GENERAL: PAINLEVEL_OUTOF10: 0

## 2025-03-18 NOTE — INTERVAL H&P NOTE
Update History & Physical    The patient's History and Physical of February 28, 2025 was reviewed with the patient and I examined the patient. There was no change. The surgical site was confirmed by the patient and me.     Plan: The risks, benefits, expected outcome, and alternative to the recommended procedure have been discussed with the patient. Patient understands and wants to proceed with the procedure.     ASA CLASSIFICATION  2  MP   CLASSIFICATION  2    Electronically signed by Brian Taylor DO on 3/18/2025 at 1:07 PM

## 2025-03-18 NOTE — OP NOTE
dressed appropriately.  The patient did not experience any hemodynamic or neurologic sequelae.      The patient was transferred to the postoperative care unit in stable condition.  Written discharge instructions were given to the patient.    COMPLICATIONS:  There were no apparent complications.  The patient tolerated the procedure well.

## 2025-04-26 ENCOUNTER — HOSPITAL ENCOUNTER (INPATIENT)
Age: 80
LOS: 2 days | Discharge: HOME | DRG: 309 | End: 2025-04-28
Attending: EMERGENCY MEDICINE | Admitting: INTERNAL MEDICINE
Payer: OTHER GOVERNMENT

## 2025-04-26 ENCOUNTER — APPOINTMENT (OUTPATIENT)
Dept: GENERAL RADIOLOGY | Age: 80
DRG: 309 | End: 2025-04-26
Attending: EMERGENCY MEDICINE
Payer: OTHER GOVERNMENT

## 2025-04-26 DIAGNOSIS — R94.31 ABNORMAL ELECTROCARDIOGRAPHY: ICD-10-CM

## 2025-04-26 DIAGNOSIS — I48.91 NEW ONSET ATRIAL FIBRILLATION (HCC): Primary | ICD-10-CM

## 2025-04-26 DIAGNOSIS — R07.9 CHEST PAIN, UNSPECIFIED TYPE: ICD-10-CM

## 2025-04-26 LAB
ALBUMIN SERPL-MCNC: 3.8 G/DL (ref 3.5–5.2)
ALP SERPL-CCNC: 62 U/L (ref 40–129)
ALT SERPL-CCNC: 15 U/L (ref 10–50)
ANION GAP SERPL CALCULATED.3IONS-SCNC: 9 MMOL/L (ref 9–16)
ANTI-XA UNFRAC HEPARIN: 0.53 IU/L (ref 0.3–0.7)
AST SERPL-CCNC: 20 U/L (ref 10–50)
BASOPHILS # BLD: 0.2 K/UL (ref 0–0.2)
BASOPHILS NFR BLD: 2 % (ref 0–2)
BILIRUB SERPL-MCNC: 0.3 MG/DL (ref 0–1.2)
BUN SERPL-MCNC: 33 MG/DL (ref 8–23)
CALCIUM SERPL-MCNC: 9.3 MG/DL (ref 8.6–10.4)
CHLORIDE SERPL-SCNC: 108 MMOL/L (ref 98–107)
CO2 SERPL-SCNC: 23 MMOL/L (ref 20–31)
CREAT SERPL-MCNC: 1.3 MG/DL (ref 0.7–1.2)
EOSINOPHIL # BLD: 0.3 K/UL (ref 0–0.4)
EOSINOPHILS RELATIVE PERCENT: 3 % (ref 0–4)
ERYTHROCYTE [DISTWIDTH] IN BLOOD BY AUTOMATED COUNT: 14.8 % (ref 11.5–14.9)
EST. AVERAGE GLUCOSE BLD GHB EST-MCNC: 100 MG/DL
GFR, ESTIMATED: 56 ML/MIN/1.73M2
GLUCOSE SERPL-MCNC: 107 MG/DL (ref 74–99)
HBA1C MFR BLD: 5.1 % (ref 4–6)
HCT VFR BLD AUTO: 39.4 % (ref 41–53)
HGB BLD-MCNC: 13.1 G/DL (ref 13.5–17.5)
INR PPP: 0.9
LIPASE SERPL-CCNC: 33 U/L (ref 13–60)
LYMPHOCYTES NFR BLD: 2.2 K/UL (ref 1–4.8)
LYMPHOCYTES RELATIVE PERCENT: 26 % (ref 24–44)
MAGNESIUM SERPL-MCNC: 1.9 MG/DL (ref 1.6–2.4)
MCH RBC QN AUTO: 29.4 PG (ref 26–34)
MCHC RBC AUTO-ENTMCNC: 33.4 G/DL (ref 31–37)
MCV RBC AUTO: 88.2 FL (ref 80–100)
MONOCYTES NFR BLD: 0.6 K/UL (ref 0.1–1.3)
MONOCYTES NFR BLD: 7 % (ref 1–7)
NEUTROPHILS NFR BLD: 62 % (ref 36–66)
NEUTS SEG NFR BLD: 5.1 K/UL (ref 1.3–9.1)
PARTIAL THROMBOPLASTIN TIME: 27.3 SEC (ref 24–36)
PLATELET # BLD AUTO: 293 K/UL (ref 150–450)
PMV BLD AUTO: 7.6 FL (ref 6–12)
POTASSIUM SERPL-SCNC: 4.2 MMOL/L (ref 3.7–5.3)
PROT SERPL-MCNC: 6.3 G/DL (ref 6.6–8.7)
PROTHROMBIN TIME: 12.8 SEC (ref 11.8–14.6)
RBC # BLD AUTO: 4.47 M/UL (ref 4.5–5.9)
SODIUM SERPL-SCNC: 140 MMOL/L (ref 136–145)
T4 FREE SERPL-MCNC: 1.2 NG/DL (ref 0.9–1.7)
TROPONIN I SERPL HS-MCNC: 37 NG/L (ref 0–22)
TROPONIN I SERPL HS-MCNC: 47 NG/L (ref 0–22)
TSH SERPL DL<=0.05 MIU/L-ACNC: 1.32 UIU/ML (ref 0.27–4.2)
WBC OTHER # BLD: 8.3 K/UL (ref 3.5–11)

## 2025-04-26 PROCEDURE — 84484 ASSAY OF TROPONIN QUANT: CPT

## 2025-04-26 PROCEDURE — 93005 ELECTROCARDIOGRAM TRACING: CPT | Performed by: EMERGENCY MEDICINE

## 2025-04-26 PROCEDURE — 85730 THROMBOPLASTIN TIME PARTIAL: CPT

## 2025-04-26 PROCEDURE — 6370000000 HC RX 637 (ALT 250 FOR IP)

## 2025-04-26 PROCEDURE — 85520 HEPARIN ASSAY: CPT

## 2025-04-26 PROCEDURE — 85610 PROTHROMBIN TIME: CPT

## 2025-04-26 PROCEDURE — 85025 COMPLETE CBC W/AUTO DIFF WBC: CPT

## 2025-04-26 PROCEDURE — 6360000002 HC RX W HCPCS

## 2025-04-26 PROCEDURE — 80053 COMPREHEN METABOLIC PANEL: CPT

## 2025-04-26 PROCEDURE — 83735 ASSAY OF MAGNESIUM: CPT

## 2025-04-26 PROCEDURE — 71045 X-RAY EXAM CHEST 1 VIEW: CPT

## 2025-04-26 PROCEDURE — 2580000003 HC RX 258: Performed by: EMERGENCY MEDICINE

## 2025-04-26 PROCEDURE — 83036 HEMOGLOBIN GLYCOSYLATED A1C: CPT

## 2025-04-26 PROCEDURE — 6360000002 HC RX W HCPCS: Performed by: EMERGENCY MEDICINE

## 2025-04-26 PROCEDURE — 83690 ASSAY OF LIPASE: CPT

## 2025-04-26 PROCEDURE — 84443 ASSAY THYROID STIM HORMONE: CPT

## 2025-04-26 PROCEDURE — 2060000000 HC ICU INTERMEDIATE R&B

## 2025-04-26 PROCEDURE — 84439 ASSAY OF FREE THYROXINE: CPT

## 2025-04-26 PROCEDURE — 99285 EMERGENCY DEPT VISIT HI MDM: CPT

## 2025-04-26 PROCEDURE — 36415 COLL VENOUS BLD VENIPUNCTURE: CPT

## 2025-04-26 RX ORDER — DILTIAZEM HYDROCHLORIDE 5 MG/ML
10 INJECTION INTRAVENOUS ONCE
Status: DISCONTINUED | OUTPATIENT
Start: 2025-04-26 | End: 2025-04-28 | Stop reason: HOSPADM

## 2025-04-26 RX ORDER — 0.9 % SODIUM CHLORIDE 0.9 %
1000 INTRAVENOUS SOLUTION INTRAVENOUS ONCE
Status: COMPLETED | OUTPATIENT
Start: 2025-04-26 | End: 2025-04-26

## 2025-04-26 RX ORDER — POTASSIUM CHLORIDE 7.45 MG/ML
10 INJECTION INTRAVENOUS PRN
Status: DISCONTINUED | OUTPATIENT
Start: 2025-04-26 | End: 2025-04-28 | Stop reason: HOSPADM

## 2025-04-26 RX ORDER — FINASTERIDE 5 MG/1
5 TABLET, FILM COATED ORAL DAILY
Status: DISCONTINUED | OUTPATIENT
Start: 2025-04-26 | End: 2025-04-28 | Stop reason: HOSPADM

## 2025-04-26 RX ORDER — TAMSULOSIN HYDROCHLORIDE 0.4 MG/1
0.4 CAPSULE ORAL DAILY
Status: DISCONTINUED | OUTPATIENT
Start: 2025-04-26 | End: 2025-04-28 | Stop reason: HOSPADM

## 2025-04-26 RX ORDER — ACETAMINOPHEN 325 MG/1
650 TABLET ORAL EVERY 6 HOURS PRN
Status: DISCONTINUED | OUTPATIENT
Start: 2025-04-26 | End: 2025-04-26

## 2025-04-26 RX ORDER — ASPIRIN 81 MG/1
81 TABLET ORAL DAILY
Status: DISCONTINUED | OUTPATIENT
Start: 2025-04-26 | End: 2025-04-28

## 2025-04-26 RX ORDER — ACETAMINOPHEN 500 MG
1000 TABLET ORAL EVERY 6 HOURS PRN
Status: DISCONTINUED | OUTPATIENT
Start: 2025-04-26 | End: 2025-04-28 | Stop reason: HOSPADM

## 2025-04-26 RX ORDER — HEPARIN SODIUM 1000 [USP'U]/ML
4000 INJECTION, SOLUTION INTRAVENOUS; SUBCUTANEOUS PRN
Status: DISCONTINUED | OUTPATIENT
Start: 2025-04-26 | End: 2025-04-28

## 2025-04-26 RX ORDER — SODIUM CHLORIDE 0.9 % (FLUSH) 0.9 %
5-40 SYRINGE (ML) INJECTION EVERY 12 HOURS SCHEDULED
Status: DISCONTINUED | OUTPATIENT
Start: 2025-04-26 | End: 2025-04-28 | Stop reason: HOSPADM

## 2025-04-26 RX ORDER — SODIUM CHLORIDE 0.9 % (FLUSH) 0.9 %
5-40 SYRINGE (ML) INJECTION PRN
Status: DISCONTINUED | OUTPATIENT
Start: 2025-04-26 | End: 2025-04-28 | Stop reason: HOSPADM

## 2025-04-26 RX ORDER — HEPARIN SODIUM 1000 [USP'U]/ML
4000 INJECTION, SOLUTION INTRAVENOUS; SUBCUTANEOUS ONCE
Status: COMPLETED | OUTPATIENT
Start: 2025-04-26 | End: 2025-04-26

## 2025-04-26 RX ORDER — ONDANSETRON 2 MG/ML
4 INJECTION INTRAMUSCULAR; INTRAVENOUS EVERY 6 HOURS PRN
Status: DISCONTINUED | OUTPATIENT
Start: 2025-04-26 | End: 2025-04-28 | Stop reason: HOSPADM

## 2025-04-26 RX ORDER — HEPARIN SODIUM 10000 [USP'U]/100ML
5-30 INJECTION, SOLUTION INTRAVENOUS CONTINUOUS
Status: DISCONTINUED | OUTPATIENT
Start: 2025-04-26 | End: 2025-04-28

## 2025-04-26 RX ORDER — POTASSIUM CHLORIDE 1500 MG/1
40 TABLET, EXTENDED RELEASE ORAL PRN
Status: DISCONTINUED | OUTPATIENT
Start: 2025-04-26 | End: 2025-04-28 | Stop reason: HOSPADM

## 2025-04-26 RX ORDER — ACETAMINOPHEN 650 MG/1
650 SUPPOSITORY RECTAL EVERY 6 HOURS PRN
Status: DISCONTINUED | OUTPATIENT
Start: 2025-04-26 | End: 2025-04-26

## 2025-04-26 RX ORDER — MAGNESIUM SULFATE HEPTAHYDRATE 40 MG/ML
2000 INJECTION, SOLUTION INTRAVENOUS PRN
Status: DISCONTINUED | OUTPATIENT
Start: 2025-04-26 | End: 2025-04-28 | Stop reason: HOSPADM

## 2025-04-26 RX ORDER — HEPARIN SODIUM 1000 [USP'U]/ML
2000 INJECTION, SOLUTION INTRAVENOUS; SUBCUTANEOUS PRN
Status: DISCONTINUED | OUTPATIENT
Start: 2025-04-26 | End: 2025-04-28

## 2025-04-26 RX ORDER — MAGNESIUM SULFATE 1 G/100ML
1000 INJECTION INTRAVENOUS ONCE
Status: COMPLETED | OUTPATIENT
Start: 2025-04-26 | End: 2025-04-26

## 2025-04-26 RX ORDER — LISINOPRIL 5 MG/1
5 TABLET ORAL DAILY
Status: DISCONTINUED | OUTPATIENT
Start: 2025-04-26 | End: 2025-04-26

## 2025-04-26 RX ORDER — ONDANSETRON 4 MG/1
4 TABLET, ORALLY DISINTEGRATING ORAL EVERY 8 HOURS PRN
Status: DISCONTINUED | OUTPATIENT
Start: 2025-04-26 | End: 2025-04-28 | Stop reason: HOSPADM

## 2025-04-26 RX ORDER — ACETAMINOPHEN 650 MG/1
650 SUPPOSITORY RECTAL EVERY 6 HOURS PRN
Status: DISCONTINUED | OUTPATIENT
Start: 2025-04-26 | End: 2025-04-28 | Stop reason: HOSPADM

## 2025-04-26 RX ORDER — SODIUM CHLORIDE 9 MG/ML
INJECTION, SOLUTION INTRAVENOUS PRN
Status: DISCONTINUED | OUTPATIENT
Start: 2025-04-26 | End: 2025-04-28 | Stop reason: HOSPADM

## 2025-04-26 RX ORDER — POLYETHYLENE GLYCOL 3350 17 G/17G
17 POWDER, FOR SOLUTION ORAL DAILY PRN
Status: DISCONTINUED | OUTPATIENT
Start: 2025-04-26 | End: 2025-04-28 | Stop reason: HOSPADM

## 2025-04-26 RX ORDER — DEXTROSE MONOHYDRATE 100 MG/ML
INJECTION, SOLUTION INTRAVENOUS CONTINUOUS PRN
Status: DISCONTINUED | OUTPATIENT
Start: 2025-04-26 | End: 2025-04-28 | Stop reason: HOSPADM

## 2025-04-26 RX ADMIN — SODIUM CHLORIDE 1000 ML: 0.9 INJECTION, SOLUTION INTRAVENOUS at 13:08

## 2025-04-26 RX ADMIN — ACETAMINOPHEN 1000 MG: 500 TABLET ORAL at 21:07

## 2025-04-26 RX ADMIN — HEPARIN SODIUM 12.06 UNITS/KG/HR: 10000 INJECTION, SOLUTION INTRAVENOUS at 18:44

## 2025-04-26 RX ADMIN — HEPARIN SODIUM 4000 UNITS: 1000 INJECTION INTRAVENOUS; SUBCUTANEOUS at 14:31

## 2025-04-26 RX ADMIN — FINASTERIDE 5 MG: 5 TABLET, FILM COATED ORAL at 21:07

## 2025-04-26 RX ADMIN — HEPARIN SODIUM 12 UNITS/KG/HR: 10000 INJECTION, SOLUTION INTRAVENOUS at 14:33

## 2025-04-26 RX ADMIN — ASPIRIN 81 MG: 81 TABLET, COATED ORAL at 21:07

## 2025-04-26 RX ADMIN — TAMSULOSIN HYDROCHLORIDE 0.4 MG: 0.4 CAPSULE ORAL at 21:07

## 2025-04-26 RX ADMIN — MAGNESIUM SULFATE HEPTAHYDRATE 1000 MG: 1 INJECTION, SOLUTION INTRAVENOUS at 17:24

## 2025-04-26 ASSESSMENT — PAIN SCALES - GENERAL
PAINLEVEL_OUTOF10: 0
PAINLEVEL_OUTOF10: 4

## 2025-04-26 ASSESSMENT — PAIN DESCRIPTION - ORIENTATION: ORIENTATION: RIGHT

## 2025-04-26 ASSESSMENT — LIFESTYLE VARIABLES
HOW MANY STANDARD DRINKS CONTAINING ALCOHOL DO YOU HAVE ON A TYPICAL DAY: PATIENT DOES NOT DRINK
HOW OFTEN DO YOU HAVE A DRINK CONTAINING ALCOHOL: NEVER

## 2025-04-26 ASSESSMENT — PAIN DESCRIPTION - LOCATION: LOCATION: SHOULDER

## 2025-04-26 ASSESSMENT — PAIN - FUNCTIONAL ASSESSMENT: PAIN_FUNCTIONAL_ASSESSMENT: 0-10

## 2025-04-26 NOTE — ED PROVIDER NOTES
units in dextrose 5% 250 mL (premix) infusion    aspirin EC tablet 81 mg    finasteride (PROSCAR) tablet 5 mg    DISCONTD: lisinopril (PRINIVIL;ZESTRIL) tablet 5 mg    tamsulosin (FLOMAX) capsule 0.4 mg    sodium chloride flush 0.9 % injection 5-40 mL    sodium chloride flush 0.9 % injection 5-40 mL    0.9 % sodium chloride infusion    OR Linked Order Group     potassium chloride (KLOR-CON M) extended release tablet 40 mEq     potassium bicarb-citric acid (EFFER-K) effervescent tablet 40 mEq     potassium chloride 10 mEq/100 mL IVPB (Peripheral Line)    magnesium sulfate 2000 mg in water 50 mL IVPB    OR Linked Order Group     ondansetron (ZOFRAN-ODT) disintegrating tablet 4 mg     ondansetron (ZOFRAN) injection 4 mg    polyethylene glycol (GLYCOLAX) packet 17 g    DISCONTD: acetaminophen (TYLENOL) tablet 650 mg    DISCONTD: acetaminophen (TYLENOL) suppository 650 mg    OR Linked Order Group     acetaminophen (TYLENOL) tablet 1,000 mg     acetaminophen (TYLENOL) suppository 650 mg    glucose chewable tablet 16 g    OR Linked Order Group     dextrose bolus 10% 125 mL     dextrose bolus 10% 250 mL    glucagon injection 1 mg    dextrose 10 % infusion     DISCHARGE PRESCRIPTIONS:  Current Discharge Medication List        PHYSICIAN CONSULTS ORDERED THIS ENCOUNTER:  IP CONSULT TO CARDIOLOGY  IP CONSULT TO INTERNAL MEDICINE  IP CONSULT TO SOCIAL WORK  FINAL IMPRESSION      1. New onset atrial fibrillation (HCC)          DISPOSITION/PLAN   DISPOSITION Admitted 04/26/2025 01:51:36 PM               OUTPATIENT FOLLOW UP THE PATIENT:  No follow-up provider specified.    DO Mikey Olguin Nathan R, DO  04/26/25 1637

## 2025-04-26 NOTE — ED NOTES
Situation  Name: Leodan Randall  Admitting: Dx New onset atrial fibrillation (HCC) [I48.91]  Isolation Precautions No active isolations  Code Status: Full Code  Alerts: N/A  Where is the patient from? Home  HPI: Patient states he was mowing his grass yesterday and felt more fatigue than usual. Then this morning he checked his Blood pressure and pulse and his pulse was 154. Patient stated that his pulse is typically in 40-50s. Denies any SOB or chest pain. A&Ox 4 coming from home.     Background  PMH:   Past Medical History:   Diagnosis Date    Acute low back pain 02/05/2019    Agent orange exposure     Alcoholism (HCC)     recovery 1995    Anemia     Anxiety     Santos's esophagus     Bradycardia     says this is usual for him, says he is active-bikes 7 days a week     Cancer (HCC)     Prostate-  radiation complete 12/2010    Colon polyps     colonoscopy recomm q 5 y     Depression     Gastroesophageal reflux disease 9/14/2023    Hyperlipidemia     Hypertension     prior to wt loss     Lumbar radiculopathy     New onset atrial fibrillation (HCC) 4/26/2025    Osteoarthritis     PTSD (post-traumatic stress disorder)     Renal cyst, left 10/10/2018    Rib fracture 2016    mechanical trip ,fell into washing machine      Allergies: No Known Allergies  Diet: No diet orders on file  Activity:   Ambulation status: Without assist  Precautions:     Flu & Covid:     Medications Administered         sodium chloride 0.9 % bolus 1,000 mL Admin Date  04/26/2025 Action  New Bag Dose  1,000 mL Rate  495.9 mL/hr Route  IntraVENous Documented By  Carisa Rahman, RN            LDA codes  Peripherally Inserted Central Catheter:    Central Venous Catheter:    Peripheral Intravenous Line:   Peripheral IV 04/26/25 Distal;Left;Posterior Forearm (Active)     Drain(s):    Airway:    Intraosseous Line:    Atrial Line:      Assessment  Pertinent assessment: A&O x 4, no previous cardiac hx  VS: Blood pressure 111/74, pulse 88,

## 2025-04-26 NOTE — H&P
[facility]  IN-PATIENT SERVICE  [facility]  IN-PATIENT SERVICE   [facility]     HISTORY AND PHYSICAL EXAMINATION            Date:   4/26/2025  Patient name:  Leodan Randall  Date of admission:  4/26/2025 12:03 PM  MRN:   [id number]  Account:  [id number]  YOB: 1945  PCP:    Jaci Hall MD  Room:   [address] [place] [address]  Code Status:    No Order    Chief Complaint:     Chief Complaint   Patient presents with   • Tachycardia   • Palpitations     Patient reports his baseline HR is high 40's low 50's and he noticed today his heart rate is 140s. He denies chest pain/shortness of breath       History Obtained From:     patient    History of Present Illness:     The patient is a 79 y.o.  Non- / non  male who presents withTachycardia and Palpitations (Patient reports his baseline HR is high 40's low 50's and he noticed today his heart rate is 140s. He denies chest pain/shortness of breath)   and he is admitted to the hospital for the management of new onset atrial fibrillation/atrial flutter.    He has a past medical history of hypertension, prostate cancer status post radiotherapy, and urinary incontinence.  He came in with a complaint of increased heart rate.  The patient reported that a day before arriving to the ER, he was mowing his lawn, felt fatigued than usual.  He did not feel any shortness of breath, dizziness, sweating, chest pain, or an episode of unconsciousness.  This morning, he was taking his blood pressure, heart rate was in 150s.  He came to the ER because of abnormal heart rate.  On presentation, EKG showed atrial fibrillation/atrial flutter.  Cardiology was consulted, initially the plan was to start the patient on Cardizem drip.  Heart rhythm came back to sinus without any intervention.  Patient was started on heparin drip for anticoagulation.    He did not report any complaints of

## 2025-04-27 LAB
ANION GAP SERPL CALCULATED.3IONS-SCNC: 9 MMOL/L (ref 9–16)
ANTI-XA UNFRAC HEPARIN: 0.56 IU/L (ref 0.3–0.7)
ANTI-XA UNFRAC HEPARIN: 0.64 IU/L (ref 0.3–0.7)
BASOPHILS # BLD: 0.1 K/UL (ref 0–0.2)
BASOPHILS NFR BLD: 2 % (ref 0–2)
BUN SERPL-MCNC: 24 MG/DL (ref 8–23)
CALCIUM SERPL-MCNC: 8.7 MG/DL (ref 8.6–10.4)
CHLORIDE SERPL-SCNC: 107 MMOL/L (ref 98–107)
CHOLEST SERPL-MCNC: 183 MG/DL (ref 0–199)
CHOLESTEROL/HDL RATIO: 3.1
CO2 SERPL-SCNC: 23 MMOL/L (ref 20–31)
CREAT SERPL-MCNC: 1.1 MG/DL (ref 0.7–1.2)
EOSINOPHIL # BLD: 0.4 K/UL (ref 0–0.4)
EOSINOPHILS RELATIVE PERCENT: 6 % (ref 0–4)
ERYTHROCYTE [DISTWIDTH] IN BLOOD BY AUTOMATED COUNT: 15 % (ref 11.5–14.9)
GFR, ESTIMATED: 69 ML/MIN/1.73M2
GLUCOSE SERPL-MCNC: 101 MG/DL (ref 74–99)
HCT VFR BLD AUTO: 36.9 % (ref 41–53)
HDLC SERPL-MCNC: 59 MG/DL
HGB BLD-MCNC: 12.2 G/DL (ref 13.5–17.5)
LDLC SERPL CALC-MCNC: 113 MG/DL (ref 0–100)
LYMPHOCYTES NFR BLD: 2 K/UL (ref 1–4.8)
LYMPHOCYTES RELATIVE PERCENT: 31 % (ref 24–44)
MCH RBC QN AUTO: 28.8 PG (ref 26–34)
MCHC RBC AUTO-ENTMCNC: 33 G/DL (ref 31–37)
MCV RBC AUTO: 87.5 FL (ref 80–100)
MONOCYTES NFR BLD: 0.5 K/UL (ref 0.1–1.3)
MONOCYTES NFR BLD: 7 % (ref 1–7)
NEUTROPHILS NFR BLD: 54 % (ref 36–66)
NEUTS SEG NFR BLD: 3.5 K/UL (ref 1.3–9.1)
PLATELET # BLD AUTO: 221 K/UL (ref 150–450)
PMV BLD AUTO: 6.8 FL (ref 6–12)
POTASSIUM SERPL-SCNC: 4.2 MMOL/L (ref 3.7–5.3)
RBC # BLD AUTO: 4.22 M/UL (ref 4.5–5.9)
SODIUM SERPL-SCNC: 139 MMOL/L (ref 136–145)
TRIGL SERPL-MCNC: 54 MG/DL (ref 0–149)
TROPONIN I SERPL HS-MCNC: 35 NG/L (ref 0–22)
WBC OTHER # BLD: 6.5 K/UL (ref 3.5–11)

## 2025-04-27 PROCEDURE — 2060000000 HC ICU INTERMEDIATE R&B

## 2025-04-27 PROCEDURE — 6370000000 HC RX 637 (ALT 250 FOR IP)

## 2025-04-27 PROCEDURE — 80061 LIPID PANEL: CPT

## 2025-04-27 PROCEDURE — 6360000002 HC RX W HCPCS: Performed by: EMERGENCY MEDICINE

## 2025-04-27 PROCEDURE — 84484 ASSAY OF TROPONIN QUANT: CPT

## 2025-04-27 PROCEDURE — 85025 COMPLETE CBC W/AUTO DIFF WBC: CPT

## 2025-04-27 PROCEDURE — 80048 BASIC METABOLIC PNL TOTAL CA: CPT

## 2025-04-27 PROCEDURE — 85520 HEPARIN ASSAY: CPT

## 2025-04-27 PROCEDURE — 36415 COLL VENOUS BLD VENIPUNCTURE: CPT

## 2025-04-27 RX ORDER — ATORVASTATIN CALCIUM 40 MG/1
40 TABLET, FILM COATED ORAL NIGHTLY
Status: DISCONTINUED | OUTPATIENT
Start: 2025-04-27 | End: 2025-04-28 | Stop reason: HOSPADM

## 2025-04-27 RX ORDER — LISINOPRIL 5 MG/1
5 TABLET ORAL DAILY
Status: DISCONTINUED | OUTPATIENT
Start: 2025-04-27 | End: 2025-04-28 | Stop reason: HOSPADM

## 2025-04-27 RX ADMIN — ACETAMINOPHEN 1000 MG: 500 TABLET ORAL at 10:32

## 2025-04-27 RX ADMIN — LISINOPRIL 5 MG: 5 TABLET ORAL at 11:49

## 2025-04-27 RX ADMIN — TAMSULOSIN HYDROCHLORIDE 0.4 MG: 0.4 CAPSULE ORAL at 20:27

## 2025-04-27 RX ADMIN — HEPARIN SODIUM 12.06 UNITS/KG/HR: 10000 INJECTION, SOLUTION INTRAVENOUS at 16:41

## 2025-04-27 RX ADMIN — ASPIRIN 81 MG: 81 TABLET, COATED ORAL at 20:27

## 2025-04-27 RX ADMIN — FINASTERIDE 5 MG: 5 TABLET, FILM COATED ORAL at 20:27

## 2025-04-27 ASSESSMENT — PAIN DESCRIPTION - ORIENTATION: ORIENTATION: RIGHT

## 2025-04-27 ASSESSMENT — PAIN DESCRIPTION - LOCATION: LOCATION: SHOULDER

## 2025-04-27 ASSESSMENT — PAIN DESCRIPTION - DESCRIPTORS: DESCRIPTORS: ACHING

## 2025-04-27 ASSESSMENT — PAIN SCALES - GENERAL: PAINLEVEL_OUTOF10: 5

## 2025-04-27 NOTE — PLAN OF CARE
Problem: Discharge Planning  Goal: Discharge to home or other facility with appropriate resources  4/27/2025 0404 by Marcy Olivera RN  Outcome: Progressing     Problem: Safety - Adult  Goal: Free from fall injury  4/27/2025 0404 by Marcy Olivera RN  Outcome: Progressing     Problem: ABCDS Injury Assessment  Goal: Absence of physical injury  4/27/2025 0404 by Marcy Olivera RN  Outcome: Progressing

## 2025-04-27 NOTE — CARE COORDINATION
Case Management Assessment  Initial Evaluation    Date/Time of Evaluation: 4/27/2025 11:47 AM  Assessment Completed by: Josseline Phelan RN    If patient is discharged prior to next notation, then this note serves as note for discharge by case management.    Patient Name: Leodan Randall                   YOB: 1945  Diagnosis: New onset atrial fibrillation (HCC) [I48.91]                   Date / Time: 4/26/2025 12:03 PM    Patient Admission Status: Inpatient   Readmission Risk (Low < 19, Mod (19-27), High > 27): Readmission Risk Score: 9.4    Current PCP: Jaci Hall MD  PCP verified by CM? Yes    Chart Reviewed: Yes      History Provided by: Patient  Patient Orientation: Alert and Oriented    Patient Cognition: Alert    Hospitalization in the last 30 days (Readmission):  No    If yes, Readmission Assessment in CM Navigator will be completed.    Advance Directives:      Code Status: Full Code   Patient's Primary Decision Maker is: Legal Next of Kin    Primary Decision Maker: Melanie Randall - Spouse - [phone]    Discharge Planning:    Patient lives with: Spouse/Significant Other, Family Members Type of Home: House  Primary Care Giver: Self  Patient Support Systems include: Spouse/Significant Other   Current Financial resources: Medicare,  ([place])  Current community resources: None  Current services prior to admission: None            Current DME:              Type of Home Care services:  None    ADLS  Prior functional level: Independent in ADLs/IADLs  Current functional level: Independent in ADLs/IADLs    PT AM-PAC:   /24  OT AM-PAC:   /24    Family can provide assistance at DC: Yes  Would you like Case Management to discuss the discharge plan with any other family members/significant others, and if so, who? No  Plans to Return to Present Housing: Yes  Other Identified Issues/Barriers to RETURNING to current housing: no barriers   Potential Assistance needed at discharge: N/A

## 2025-04-27 NOTE — CONSULTS
problem  Respiratory: No previous reported problems  Gastrointestinal: No abdominal pain, appetite loss, blood in stools. No change in bowel or bladder habits.  Genitourinary: No dysuria, trouble voiding, or hematuria.  Musculoskeletal:  No gait disturbance, No weakness or joint complaints.  Integumentary: No rash or pruritis.  Neurological: No headache, diplopia, change in muscle strength, numbness or tingling. No change in gait, balance, coordination, mood, affect, memory, mentation, behavior.  Psychiatric: No anxiety, or depression.  Endocrine: No temperature intolerance. No excessive thirst, fluid intake, or urination. No tremor.  Hematologic/Lymphatic: No abnormal bruising or bleeding, blood clots or swollen lymph nodes.  Allergic/Immunologic: No nasal congestion or hives.    PHYSICAL EXAM:    Physical Examination:    /76   Pulse 79   Temp 97.6 °F (36.4 °C) (Oral)   Resp 16   Ht 1.778 m (5' 10\")   Wt 77.1 kg (170 lb)   SpO2 97%   BMI 24.39 kg/m²    Constitutional and General Appearance: alert, cooperative, no distress and appears stated age  HEENT: PERRL, no cervical lymphadenopathy. No masses palpable. Normal oral mucosa  Respiratory:  Normal excursion and expansion without use of accessory muscles  Resp Auscultation: Good respiratory effort. No for increased work of breathing. On auscultation: clear to auscultation bilaterally  Cardiovascular:  The apical impulse is not displaced  Heart tones are crisp and normal. Irregular S1 and S2. Murmurs:  None  Jugular venous pulsation Normal  The carotid upstroke is normal in amplitude and contour without delay or bruit  Peripheral pulses are symmetrical and full   Abdomen:  No masses or tenderness  Bowel sounds present  Extremities:   No Cyanosis or Clubbing   Lower extremity edema: None   Skin: Warm and dry  Neurological:  Alert and oriented.  Moves all extremities well  No abnormalities of mood, affect, memory, mentation, or behavior are noted    DATA:

## 2025-04-27 NOTE — PLAN OF CARE
Problem: Safety - Adult  Goal: Free from fall injury  4/27/2025 1743 by Camille Santiago, RN  Outcome: Progressing,Patient remains free of incidence/ injury. Bed remains in low position. Side rails up x2.

## 2025-04-27 NOTE — ACP (ADVANCE CARE PLANNING)
Advance Care Planning     Advance Care Planning Activator (Inpatient)  Conversation Note      Date of ACP Conversation: 4/27/2025     Conversation Conducted with: Patient with Decision Making Capacity    ACP Activator: Josseline Phelan RN      Health Care Decision Maker:     Current Designated Health Care Decision Maker:     Primary Decision Maker: Melanie Randall - Spouse - [phone]    Today we documented Decision Maker(s) consistent with Legal Next of Kin hierarchy.    Care Preferences    Ventilation:  \"If you were in your present state of health and suddenly became very ill and were unable to breathe on your own, what would your preference be about the use of a ventilator (breathing machine) if it were available to you?\"      Would the patient desire the use of ventilator (breathing machine)?: yes    \"If your health worsens and it becomes clear that your chance of recovery is unlikely, what would your preference be about the use of a ventilator (breathing machine) if it were available to you?\"     Would the patient desire the use of ventilator (breathing machine)?: No      Resuscitation  \"CPR works best to restart the heart when there is a sudden event, like a heart attack, in someone who is otherwise healthy. Unfortunately, CPR does not typically restart the heart for people who have serious health conditions or who are very sick.\"    \"In the event your heart stopped as a result of an underlying serious health condition, would you want attempts to be made to restart your heart (answer \"yes\" for attempt to resuscitate) or would you prefer a natural death (answer \"no\" for do not attempt to resuscitate)?\" yes       [] Yes   [] No   Educated Patient / Decision Maker regarding differences between Advance Directives and portable DNR orders.    Length of ACP Conversation in minutes:      Conversation Outcomes:  ACP discussion completed    Follow-up plan:    [] Schedule follow-up conversation to continue planning  []

## 2025-04-28 ENCOUNTER — APPOINTMENT (OUTPATIENT)
Age: 80
DRG: 309 | End: 2025-04-28
Payer: OTHER GOVERNMENT

## 2025-04-28 ENCOUNTER — APPOINTMENT (OUTPATIENT)
Dept: NUCLEAR MEDICINE | Age: 80
DRG: 309 | End: 2025-04-28
Payer: OTHER GOVERNMENT

## 2025-04-28 ENCOUNTER — HOSPITAL ENCOUNTER (INPATIENT)
Age: 80
Discharge: HOME | DRG: 309 | End: 2025-04-30
Payer: OTHER GOVERNMENT

## 2025-04-28 VITALS
SYSTOLIC BLOOD PRESSURE: 123 MMHG | RESPIRATION RATE: 16 BRPM | TEMPERATURE: 97.7 F | HEIGHT: 70 IN | DIASTOLIC BLOOD PRESSURE: 60 MMHG | BODY MASS INDEX: 24.34 KG/M2 | WEIGHT: 170 LBS | OXYGEN SATURATION: 100 % | HEART RATE: 64 BPM

## 2025-04-28 LAB
ANION GAP SERPL CALCULATED.3IONS-SCNC: 6 MMOL/L (ref 9–16)
ANTI-XA UNFRAC HEPARIN: 0.37 IU/L (ref 0.3–0.7)
BASOPHILS # BLD: 0.1 K/UL (ref 0–0.2)
BASOPHILS NFR BLD: 1 % (ref 0–2)
BUN SERPL-MCNC: 24 MG/DL (ref 8–23)
CALCIUM SERPL-MCNC: 8.8 MG/DL (ref 8.6–10.4)
CHLORIDE SERPL-SCNC: 107 MMOL/L (ref 98–107)
CO2 SERPL-SCNC: 26 MMOL/L (ref 20–31)
CREAT SERPL-MCNC: 1.4 MG/DL (ref 0.7–1.2)
ECHO AO ASC DIAM: 3.7 CM
ECHO AO ASCENDING AORTA INDEX: 1.9 CM/M2
ECHO AO ROOT DIAM: 3.7 CM
ECHO AO ROOT INDEX: 1.9 CM/M2
ECHO AV AREA PEAK VELOCITY: 2.4 CM2
ECHO AV AREA VTI: 2.5 CM2
ECHO AV AREA/BSA PEAK VELOCITY: 1.2 CM2/M2
ECHO AV AREA/BSA VTI: 1.3 CM2/M2
ECHO AV MEAN GRADIENT: 3 MMHG
ECHO AV MEAN VELOCITY: 0.8 M/S
ECHO AV PEAK GRADIENT: 5 MMHG
ECHO AV PEAK VELOCITY: 1.1 M/S
ECHO AV VELOCITY RATIO: 0.82
ECHO AV VTI: 22.6 CM
ECHO BSA: 1.95 M2
ECHO BSA: 1.95 M2
ECHO LA AREA 2C: 24.5 CM2
ECHO LA AREA 4C: 17.4 CM2
ECHO LA DIAMETER INDEX: 2.36 CM/M2
ECHO LA DIAMETER: 4.6 CM
ECHO LA MAJOR AXIS: 5.5 CM
ECHO LA MINOR AXIS: 6.5 CM
ECHO LA TO AORTIC ROOT RATIO: 1.24
ECHO LA VOL BP: 63 ML (ref 18–58)
ECHO LA VOL MOD A2C: 74 ML (ref 18–58)
ECHO LA VOL MOD A4C: 47 ML (ref 18–58)
ECHO LA VOL/BSA BIPLANE: 32 ML/M2 (ref 16–34)
ECHO LA VOLUME INDEX MOD A2C: 38 ML/M2 (ref 16–34)
ECHO LA VOLUME INDEX MOD A4C: 24 ML/M2 (ref 16–34)
ECHO LV E' LATERAL VELOCITY: 6.2 CM/S
ECHO LV E' SEPTAL VELOCITY: 5.22 CM/S
ECHO LV EDV A2C: 121 ML
ECHO LV EDV A4C: 148 ML
ECHO LV EDV INDEX A4C: 76 ML/M2
ECHO LV EDV NDEX A2C: 62 ML/M2
ECHO LV EF PHYSICIAN: 55 %
ECHO LV EJECTION FRACTION A2C: 58 %
ECHO LV EJECTION FRACTION A4C: 56 %
ECHO LV EJECTION FRACTION BIPLANE: 57 % (ref 55–100)
ECHO LV ESV A2C: 51 ML
ECHO LV ESV A4C: 65 ML
ECHO LV ESV INDEX A2C: 26 ML/M2
ECHO LV ESV INDEX A4C: 33 ML/M2
ECHO LV FRACTIONAL SHORTENING: 31 % (ref 28–44)
ECHO LV INTERNAL DIMENSION DIASTOLE INDEX: 2.51 CM/M2
ECHO LV INTERNAL DIMENSION DIASTOLIC: 4.9 CM (ref 4.2–5.9)
ECHO LV INTERNAL DIMENSION SYSTOLIC INDEX: 1.74 CM/M2
ECHO LV INTERNAL DIMENSION SYSTOLIC: 3.4 CM
ECHO LV IVSD: 1.2 CM (ref 0.6–1)
ECHO LV MASS 2D: 226.4 G (ref 88–224)
ECHO LV MASS INDEX 2D: 116.1 G/M2 (ref 49–115)
ECHO LV POSTERIOR WALL DIASTOLIC: 1.2 CM (ref 0.6–1)
ECHO LV RELATIVE WALL THICKNESS RATIO: 0.49
ECHO LVOT AREA: 3.1 CM2
ECHO LVOT AV VTI INDEX: 0.78
ECHO LVOT DIAM: 2 CM
ECHO LVOT MEAN GRADIENT: 2 MMHG
ECHO LVOT PEAK GRADIENT: 3 MMHG
ECHO LVOT PEAK VELOCITY: 0.9 M/S
ECHO LVOT STROKE VOLUME INDEX: 28.3 ML/M2
ECHO LVOT SV: 55.3 ML
ECHO LVOT VTI: 17.6 CM
ECHO MV A VELOCITY: 0.54 M/S
ECHO MV AREA VTI: 1.8 CM2
ECHO MV E DECELERATION TIME (DT): 359 MS
ECHO MV E VELOCITY: 0.48 M/S
ECHO MV E/A RATIO: 0.89
ECHO MV E/E' LATERAL: 7.74
ECHO MV E/E' RATIO (AVERAGED): 8.47
ECHO MV E/E' SEPTAL: 9.2
ECHO MV LVOT VTI INDEX: 1.7
ECHO MV MAX VELOCITY: 0.8 M/S
ECHO MV MEAN GRADIENT: 1 MMHG
ECHO MV MEAN VELOCITY: 0.6 M/S
ECHO MV PEAK GRADIENT: 3 MMHG
ECHO MV REGURGITANT ALIASING (NYQUIST) VELOCITY: 36 CM/S
ECHO MV REGURGITANT PEAK GRADIENT: 149 MMHG
ECHO MV REGURGITANT PEAK VELOCITY: 6.1 M/S
ECHO MV REGURGITANT RADIUS PISA: 0.8 CM
ECHO MV REGURGITANT VTIA: 206 CM
ECHO MV VTI: 29.9 CM
ECHO RA AREA 4C: 13.8 CM2
ECHO RA END SYSTOLIC VOLUME APICAL 4 CHAMBER INDEX BSA: 12 ML/M2
ECHO RA VOLUME: 24 ML
ECHO RV BASAL DIMENSION: 3.7 CM
ECHO RV TAPSE: 1.7 CM (ref 1.7–?)
EOSINOPHIL # BLD: 0.4 K/UL (ref 0–0.4)
EOSINOPHILS RELATIVE PERCENT: 6 % (ref 0–4)
ERYTHROCYTE [DISTWIDTH] IN BLOOD BY AUTOMATED COUNT: 15.2 % (ref 11.5–14.9)
GFR, ESTIMATED: 51 ML/MIN/1.73M2
GLUCOSE SERPL-MCNC: 94 MG/DL (ref 74–99)
HCT VFR BLD AUTO: 33.5 % (ref 41–53)
HGB BLD-MCNC: 11.3 G/DL (ref 13.5–17.5)
LYMPHOCYTES NFR BLD: 1.9 K/UL (ref 1–4.8)
LYMPHOCYTES RELATIVE PERCENT: 35 % (ref 24–44)
MCH RBC QN AUTO: 29.5 PG (ref 26–34)
MCHC RBC AUTO-ENTMCNC: 33.8 G/DL (ref 31–37)
MCV RBC AUTO: 87.4 FL (ref 80–100)
MONOCYTES NFR BLD: 0.5 K/UL (ref 0.1–1.3)
MONOCYTES NFR BLD: 10 % (ref 1–7)
NEUTROPHILS NFR BLD: 48 % (ref 36–66)
NEUTS SEG NFR BLD: 2.7 K/UL (ref 1.3–9.1)
NUC STRESS EJECTION FRACTION: 57 %
PLATELET # BLD AUTO: 210 K/UL (ref 150–450)
PMV BLD AUTO: 6.6 FL (ref 6–12)
POTASSIUM SERPL-SCNC: 4.5 MMOL/L (ref 3.7–5.3)
RBC # BLD AUTO: 3.83 M/UL (ref 4.5–5.9)
SODIUM SERPL-SCNC: 139 MMOL/L (ref 136–145)
STRESS BASELINE DIAS BP: 74 MMHG
STRESS BASELINE HR: 58 BPM
STRESS BASELINE SYS BP: 132 MMHG
STRESS ESTIMATED WORKLOAD: 1 METS
STRESS PEAK DIAS BP: 73 MMHG
STRESS PEAK SYS BP: 143 MMHG
STRESS PERCENT HR ACHIEVED: 55 %
STRESS POST PEAK HR: 78 BPM
STRESS RATE PRESSURE PRODUCT: NORMAL BPM*MMHG
STRESS STAGE RECOVERY 1 BP: NORMAL MMHG
STRESS STAGE RECOVERY 1 DURATION: 1 MIN:SEC
STRESS STAGE RECOVERY 1 HR: 78 BPM
STRESS STAGE RECOVERY 2 BP: NORMAL MMHG
STRESS STAGE RECOVERY 2 DURATION: 2 MIN:SEC
STRESS STAGE RECOVERY 2 HR: 85 BPM
STRESS STAGE RECOVERY 3 BP: NORMAL MMHG
STRESS STAGE RECOVERY 3 DURATION: 4 MIN:SEC
STRESS STAGE RECOVERY 3 HR: 75 BPM
STRESS TARGET HR: 142 BPM
TID: 0.92
WBC OTHER # BLD: 5.5 K/UL (ref 3.5–11)

## 2025-04-28 PROCEDURE — 85520 HEPARIN ASSAY: CPT

## 2025-04-28 PROCEDURE — 93306 TTE W/DOPPLER COMPLETE: CPT

## 2025-04-28 PROCEDURE — 93018 CV STRESS TEST I&R ONLY: CPT | Performed by: INTERNAL MEDICINE

## 2025-04-28 PROCEDURE — 93306 TTE W/DOPPLER COMPLETE: CPT | Performed by: INTERNAL MEDICINE

## 2025-04-28 PROCEDURE — A9500 TC99M SESTAMIBI: HCPCS | Performed by: INTERNAL MEDICINE

## 2025-04-28 PROCEDURE — 6370000000 HC RX 637 (ALT 250 FOR IP): Performed by: NURSE PRACTITIONER

## 2025-04-28 PROCEDURE — 6370000000 HC RX 637 (ALT 250 FOR IP)

## 2025-04-28 PROCEDURE — 3430000000 HC RX DIAGNOSTIC RADIOPHARMACEUTICAL: Performed by: INTERNAL MEDICINE

## 2025-04-28 PROCEDURE — 36415 COLL VENOUS BLD VENIPUNCTURE: CPT

## 2025-04-28 PROCEDURE — 85025 COMPLETE CBC W/AUTO DIFF WBC: CPT

## 2025-04-28 PROCEDURE — 2500000003 HC RX 250 WO HCPCS: Performed by: INTERNAL MEDICINE

## 2025-04-28 PROCEDURE — 2580000003 HC RX 258

## 2025-04-28 PROCEDURE — 80048 BASIC METABOLIC PNL TOTAL CA: CPT

## 2025-04-28 PROCEDURE — 6360000002 HC RX W HCPCS: Performed by: INTERNAL MEDICINE

## 2025-04-28 PROCEDURE — 78452 HT MUSCLE IMAGE SPECT MULT: CPT

## 2025-04-28 PROCEDURE — 93017 CV STRESS TEST TRACING ONLY: CPT

## 2025-04-28 PROCEDURE — 99233 SBSQ HOSP IP/OBS HIGH 50: CPT | Performed by: NURSE PRACTITIONER

## 2025-04-28 PROCEDURE — 93016 CV STRESS TEST SUPVJ ONLY: CPT | Performed by: INTERNAL MEDICINE

## 2025-04-28 RX ORDER — AMINOPHYLLINE 25 MG/ML
50 INJECTION, SOLUTION INTRAVENOUS PRN
Status: ACTIVE | OUTPATIENT
Start: 2025-04-28 | End: 2025-04-28

## 2025-04-28 RX ORDER — ATROPINE SULFATE 0.1 MG/ML
0.5 INJECTION INTRAVENOUS EVERY 5 MIN PRN
Status: CANCELLED | OUTPATIENT
Start: 2025-04-28 | End: 2025-04-28

## 2025-04-28 RX ORDER — SODIUM CHLORIDE 0.9 % (FLUSH) 0.9 %
5-40 SYRINGE (ML) INJECTION PRN
Status: ACTIVE | OUTPATIENT
Start: 2025-04-28 | End: 2025-04-28

## 2025-04-28 RX ORDER — ALBUTEROL SULFATE 90 UG/1
2 INHALANT RESPIRATORY (INHALATION) PRN
Status: CANCELLED | OUTPATIENT
Start: 2025-04-28 | End: 2025-04-28

## 2025-04-28 RX ORDER — TETRAKIS(2-METHOXYISOBUTYLISOCYANIDE)COPPER(I) TETRAFLUOROBORATE 1 MG/ML
35 INJECTION, POWDER, LYOPHILIZED, FOR SOLUTION INTRAVENOUS
Status: COMPLETED | OUTPATIENT
Start: 2025-04-28 | End: 2025-04-28

## 2025-04-28 RX ORDER — SODIUM CHLORIDE 0.9 % (FLUSH) 0.9 %
5-40 SYRINGE (ML) INJECTION PRN
Status: CANCELLED | OUTPATIENT
Start: 2025-04-28 | End: 2025-04-28

## 2025-04-28 RX ORDER — METOPROLOL TARTRATE 1 MG/ML
5 INJECTION, SOLUTION INTRAVENOUS EVERY 5 MIN PRN
Status: ACTIVE | OUTPATIENT
Start: 2025-04-28 | End: 2025-04-28

## 2025-04-28 RX ORDER — SODIUM CHLORIDE 9 MG/ML
500 INJECTION, SOLUTION INTRAVENOUS CONTINUOUS PRN
Status: ACTIVE | OUTPATIENT
Start: 2025-04-28 | End: 2025-04-28

## 2025-04-28 RX ORDER — SODIUM CHLORIDE 0.9 % (FLUSH) 0.9 %
10 SYRINGE (ML) INJECTION PRN
Status: DISCONTINUED | OUTPATIENT
Start: 2025-04-28 | End: 2025-04-28 | Stop reason: HOSPADM

## 2025-04-28 RX ORDER — ALBUTEROL SULFATE 90 UG/1
2 INHALANT RESPIRATORY (INHALATION) PRN
Status: ACTIVE | OUTPATIENT
Start: 2025-04-28 | End: 2025-04-28

## 2025-04-28 RX ORDER — ATROPINE SULFATE 0.1 MG/ML
0.5 INJECTION INTRAVENOUS EVERY 5 MIN PRN
Status: ACTIVE | OUTPATIENT
Start: 2025-04-28 | End: 2025-04-28

## 2025-04-28 RX ORDER — NITROGLYCERIN 0.4 MG/1
0.4 TABLET SUBLINGUAL EVERY 5 MIN PRN
Status: CANCELLED | OUTPATIENT
Start: 2025-04-28 | End: 2025-04-28

## 2025-04-28 RX ORDER — NITROGLYCERIN 0.4 MG/1
0.4 TABLET SUBLINGUAL EVERY 5 MIN PRN
Status: ACTIVE | OUTPATIENT
Start: 2025-04-28 | End: 2025-04-28

## 2025-04-28 RX ORDER — SODIUM CHLORIDE 9 MG/ML
500 INJECTION, SOLUTION INTRAVENOUS CONTINUOUS PRN
Status: CANCELLED | OUTPATIENT
Start: 2025-04-28 | End: 2025-04-28

## 2025-04-28 RX ORDER — REGADENOSON 0.08 MG/ML
0.4 INJECTION, SOLUTION INTRAVENOUS
Status: COMPLETED | OUTPATIENT
Start: 2025-04-28 | End: 2025-04-28

## 2025-04-28 RX ORDER — REGADENOSON 0.08 MG/ML
0.4 INJECTION, SOLUTION INTRAVENOUS
Status: CANCELLED | OUTPATIENT
Start: 2025-04-28

## 2025-04-28 RX ORDER — TETRAKIS(2-METHOXYISOBUTYLISOCYANIDE)COPPER(I) TETRAFLUOROBORATE 1 MG/ML
15 INJECTION, POWDER, LYOPHILIZED, FOR SOLUTION INTRAVENOUS
Status: COMPLETED | OUTPATIENT
Start: 2025-04-28 | End: 2025-04-28

## 2025-04-28 RX ORDER — SODIUM CHLORIDE 9 MG/ML
INJECTION, SOLUTION INTRAVENOUS CONTINUOUS
Status: DISCONTINUED | OUTPATIENT
Start: 2025-04-28 | End: 2025-04-28 | Stop reason: HOSPADM

## 2025-04-28 RX ORDER — METOPROLOL TARTRATE 1 MG/ML
5 INJECTION, SOLUTION INTRAVENOUS EVERY 5 MIN PRN
Status: CANCELLED | OUTPATIENT
Start: 2025-04-28 | End: 2025-04-28

## 2025-04-28 RX ORDER — AMINOPHYLLINE 25 MG/ML
50 INJECTION, SOLUTION INTRAVENOUS PRN
Status: CANCELLED | OUTPATIENT
Start: 2025-04-28 | End: 2025-04-28

## 2025-04-28 RX ADMIN — APIXABAN 5 MG: 5 TABLET, FILM COATED ORAL at 14:52

## 2025-04-28 RX ADMIN — REGADENOSON 0.4 MG: 0.08 INJECTION, SOLUTION INTRAVENOUS at 09:51

## 2025-04-28 RX ADMIN — SODIUM CHLORIDE, PRESERVATIVE FREE 10 ML: 5 INJECTION INTRAVENOUS at 12:52

## 2025-04-28 RX ADMIN — SODIUM CHLORIDE, PRESERVATIVE FREE 10 ML: 5 INJECTION INTRAVENOUS at 09:53

## 2025-04-28 RX ADMIN — Medication 36.6 MILLICURIE: at 12:52

## 2025-04-28 RX ADMIN — Medication 15 MILLICURIE: at 09:53

## 2025-04-28 RX ADMIN — LISINOPRIL 5 MG: 5 TABLET ORAL at 11:45

## 2025-04-28 RX ADMIN — ACETAMINOPHEN 1000 MG: 500 TABLET ORAL at 03:24

## 2025-04-28 RX ADMIN — SODIUM CHLORIDE: 0.9 INJECTION, SOLUTION INTRAVENOUS at 11:44

## 2025-04-28 ASSESSMENT — ENCOUNTER SYMPTOMS
SHORTNESS OF BREATH: 1
ABDOMINAL PAIN: 0
CHEST TIGHTNESS: 1

## 2025-04-28 ASSESSMENT — PAIN DESCRIPTION - ORIENTATION: ORIENTATION: RIGHT

## 2025-04-28 ASSESSMENT — PAIN DESCRIPTION - LOCATION: LOCATION: SHOULDER

## 2025-04-28 ASSESSMENT — PAIN SCALES - GENERAL: PAINLEVEL_OUTOF10: 6

## 2025-04-28 NOTE — PROGRESS NOTES
440 pm - Residents notified to send eliquis to in pt pharmacy early while waiting on echo to result so we can fill it before they close.    520 pm - Residents notified echo is resulted and need for dc orders .   
[facility]   OCCUPATIONAL THERAPY MISSED TREATMENT NOTE   INPATIENT   Date: 25  Patient Name: Leodan Randall       Room:   MRN: [id number]   Account #: [id number]    : 1945  (79 y.o.)  Gender: male     REASON FOR MISSED TREATMENT:  Pt reports that he is IND with self-care and mobility.  Denies need for skilled therapy at this time.    -   OT being discontinued at this time. Patient functioning at Premorbid Level  No further needs  1120    Electronically signed by JENNIFER Jung on 25 at 1:03 PM EDT    
Dc paperwork reviewed with pt. Reviewed med details and f/u appt. Pt received meds to beds eliquis free 30 day supply. Iv and heart monitor removed.   
FARZAD Zelayatia notified of stress results:    IMPRESSION:  No clear scintigraphic evidence for reversible ischemia or infarct.      Left ventricular ejection fraction of 57%.       Please see report for EKG portion of the examination which will be performed separately by physician from cardiology.             Risk stratification:  [Low risk]  
Physical Therapy        Physical Therapy Screen  Note      DATE: 2025    NAME: Leodan Randall  MRN: [id number]   : 1945      Patient  seen this date for Physical Therapy screen:    2025 at 1120:  D/C PT.  Pt denies need for skilled PT.  Pt reports functional balance and strength. Pt up in room independently.  Case discussed w/ nurse Hendricks.       Electronically signed by Melly Matthews PT on 2025 at 1:02 PM      
Pt arrives to unit from ED. Vital signs taken, telemetry applied, admission documentation complete, initial assessment complete, bed locked in low position, call light within reach, pt oriented to room.    
Residents team and Dr. Katz notified of pt trop of 47.  
[facility]   Progress Note                   Date:   4/27/25  Patient name: Leodan Randall  Date of admission:  4/26/2025 12:03 PM  MRN:   [id number]  YOB: 1945  PCP: Jaci Hall MD    Reason for Admission:     Subjective:       Clinical Changes / Abnormalities:  Pt feeling well after spontaneous conversion to sinus rhythm; denies CP, SOB, palpitation or lightheadedness      Medications:   Scheduled Meds:   atorvastatin  40 mg Oral Nightly    lisinopril  5 mg Oral Daily    dilTIAZem  10 mg IntraVENous Once    aspirin  81 mg Oral Daily    finasteride  5 mg Oral Daily    tamsulosin  0.4 mg Oral Daily    sodium chloride flush  5-40 mL IntraVENous 2 times per day     Continuous Infusions:   [Held by provider] dilTIAZem 125 mg in sodium chloride 0.9 % 125 mL infusion Stopped (04/27/25 1309)    heparin (PORCINE) Infusion 12.062 Units/kg/hr (04/27/25 1641)    sodium chloride      dextrose       CBC:   Recent Labs     04/26/25  1219 04/27/25  0308 04/28/25  0559   WBC 8.3 6.5 5.5   HGB 13.1* 12.2* 11.3*    221 210     BMP:    Recent Labs     04/26/25  1255 04/27/25  0308 04/28/25  0559    139 139   K 4.2 4.2 4.5   * 107 107   CO2 23 23 26   BUN 33* 24* 24*   CREATININE 1.3* 1.1 1.4*   GLUCOSE 107* 101* 94     Hepatic:   Recent Labs     04/26/25  1255   AST 20   ALT 15   BILITOT 0.3   ALKPHOS 62     Troponin: No results for input(s): \"TROPONINI\" in the last 72 hours.  BNP: No results for input(s): \"BNP\" in the last 72 hours.  Lipids:   Recent Labs     04/27/25  0308   CHOL 183   HDL 59     INR:   Recent Labs     04/26/25  1219   INR 0.9       Objective:   Vitals: /66   Pulse 53   Temp 98 °F (36.7 °C) (Oral)   Resp 16   Ht 1.778 m (5' 10\")   Wt 77.1 kg (170 lb)   SpO2 98%   BMI 24.39 kg/m²   General appearance: alert and cooperative with exam  HEENT: Head: Normocephalic, no lesions, without obvious abnormality.  Neck: no adenopathy, no carotid bruit, no 
Writer reviewed KEKE Younger handoff, no questions at this time.  
Writer spoke with The Resident regarding Cardizem drip, okay to continue holding med d/t patient in NSR.    Notified Resident that patient stool was formed, okay to dc cdiff orders.  
symmetrical, trachea midline and thyroid not enlarged, symmetric, no tenderness/mass/nodules  Lungs: clear to auscultation bilaterally  Heart: regular rate and rhythm, S1, S2 normal, no murmur, click, rub or gallop  Abdomen: soft, non-tender; bowel sounds normal; no masses,  no organomegaly  Extremities: extremities normal, atraumatic, no cyanosis or edema  Neurologic: Mental status: Alert, oriented, thought content appropriate          IMPRESSION:       New onset PAF; now back in sinus rhythm  Mildly elevated troponin likely due to supply/ demand mismatch  Essential HTN  HLP  H/o resting sinus bradycardia      [id number] Score for Atrial Fibrillation Stroke Risk   Risk   Factors  Component Value   C CHF No 0   H HTN No 0   A2 Age >= 75 Yes,  (79 y.o.) 2   D DM No 0   S2 Prior Stroke/TIA No 0   V Vascular Disease No 0   A Age 65-74 No,  (79 y.o.) 0   Sc Sex male 0    [id number]  Score  2   Score last updated 4/28/25 9:53 AM EDT    Click here for a link to the UpToDate guideline \"Atrial Fibrillation: Anticoagulation therapy to prevent embolization    Disclaimer: Risk Score calculation is dependent on accuracy of patient problem list and past encounter diagnosis.   RECOMMENDATIONS:     Continue IV heparin; diltiazem has been discontinued  2D echo and Lexiscan stress test today.  Await results   Convert Iv heparin to Eliquis prior to discharge  If testing low risk, no further workup and can follow up as outpt.        Electronically signed by HAKEEM DE LA PAZ CNP on 4/28/2025 at 8:29 AM  [facility]  [phone]      
Normal.     Left basilar airspace opacities with subtle blunting of the left costophrenic angle.  Findings may represent a small left pleural effusion versus underlying atelectasis.         Physical Examination:        Physical Exam  Constitutional:       Appearance: Normal appearance.   Cardiovascular:      Rate and Rhythm: Normal rate and regular rhythm.      Pulses: Normal pulses.   Pulmonary:      Effort: Pulmonary effort is normal.      Breath sounds: Normal breath sounds.   Abdominal:      General: There is no distension.      Palpations: Abdomen is soft.   Musculoskeletal:      Right lower leg: No edema.      Left lower leg: No edema.   Neurological:      Mental Status: He is alert.           Assessment:        Primary Problem  New onset atrial fibrillation (HCC)    Active Hospital Problems    Diagnosis Date Noted    New onset atrial fibrillation (HCC) [I48.91] 04/26/2025       Plan:        New onset atrial fibrillation  Patient reported that he started feeling fatigued, heart rate was around 150s morning before admission  He was mowing his lawn, felt fatigued than usual  No leukocytosis, no fever, no chest pain, no shortness of breath  On presentation, EKG showed atrial flutter and then atrial fibrillation.  Heart rate was 138 on presentation  Rhythm changes sinus without any intervention  Electrolytes within normal limits  TSH 1.32, T41.2  Magnesium 1.9  Troponin was elevated 37,47,37  Patient had a Holter monitor because of bradycardia in the past, predominant rhythm was sinus bradycardia/sinus rhythm first-degree AV block.  EKG on presentation showed undetermined rhythm, minimal voltage criteria for LVH may be normal variant.  Atrial Fibrillation CHADSVASC2 Score Stroke Risk:   79 y.o. > 75 - 2    male Male - 0   CHF HX: No - 0   HTN HX: Yes - 1   Stroke/TIA/Thromboembolism No - 0   Vascular Disease HX: No - 0   Diabetes Mellitus No - 0   CHADSVASC 2 Score 3      Annual Stroke Risk 3.2% - moderate-high  
Tonia and I have examined the patient myself and taken ROS and HPI, including pertinent history and exam findings, with the resident. I have reviewed the key elements of all parts of the encounter with the resident.  I agree with the assessment, plan and orders as documented by the resident.  Plan for stress test and echo today  Likely discharge later today if stress test is negative on Eliquis  Heart rate well-controlled    Electronically signed by Rober Zabala MD

## 2025-04-28 NOTE — DISCHARGE INSTRUCTIONS
- Start taking Eliquis as prescribed  -Follow-up with cardiology, call for appointment  - If you begin to experience any symptoms such as chest pain, shortness of breath, nausea, vomiting, dizziness, drowsiness, abdominal pain, loss of consciousness, or any other symptoms you find concerning please return to the ED for follow-up evaluation.  -If you have been given medication please take them as prescribed. Do not take more medication than recommended at any given time.   -Please follow-up with your primary care provider within 7 days for continued care.   -Please feel free return to the hospital if your symptoms worsen or any new concerning symptoms develop.  Follow-up with your primary care physician as needed for all other the concerns.

## 2025-04-28 NOTE — DISCHARGE INSTR - DIET

## 2025-04-28 NOTE — PLAN OF CARE
Problem: Discharge Planning  Goal: Discharge to home or other facility with appropriate resources  4/28/2025 1729 by Marah Santacruz RN  Outcome: Adequate for Discharge     Problem: Safety - Adult  Goal: Free from fall injury  4/28/2025 1729 by Marah Santacruz RN  Outcome: Adequate for Discharge     Problem: ABCDS Injury Assessment  Goal: Absence of physical injury  4/28/2025 1729 by Marah Santacruz RN  Outcome: Adequate for Discharge

## 2025-04-28 NOTE — CARE COORDINATION
Writer sent a message to Marah DAVIES about eliquis needs to be sent to [facility] and free 30 day card for med.     Electronically signed by Josseline Phelan RN on 4/28/2025 at 4:23 PM     Writer called and spoke with [facility] with Meds to Beds. Patient agreeable to 30 day free. Writer informed her of possible discharge after pharmacy closes.   Electronically signed by Josseline Phelan RN on 4/28/2025 at 4:52 PM

## 2025-04-28 NOTE — PLAN OF CARE
Stress reviewed, negative ischemia, low risk      OK to switch Heparin to Eliquis.  Remains SB so will hold off on BB/Cardizem at this time.    F/U in clinic as scheduled.    Elyssa Estrada, APRN - CNP

## 2025-04-28 NOTE — CARE COORDINATION
ONGOING DISCHARGE PLAN:    Patient is alert and oriented x4.    Spoke with patient regarding discharge plan and patient confirms that plan is still home. Denies needs.    Cardio consult  New Afib  Eliquis at discharge. Patient would like meds to beds and 30 day free. [facility]   Stress pending  Echo pending    IMM letter provided to patient.  Patient offered four hours to make informed decision regarding appeal process; patient agreeable to discharge.      Will continue to follow for additional discharge needs.    If patient is discharged prior to next notation, then this note serves as note for discharge by case management.    Electronically signed by Josseline Phelan RN on 4/28/2025 at 12:13 PM

## 2025-04-29 LAB
EKG ATRIAL RATE: 133 BPM
EKG ATRIAL RATE: 266 BPM
EKG Q-T INTERVAL: 304 MS
EKG Q-T INTERVAL: 332 MS
EKG QRS DURATION: 84 MS
EKG QRS DURATION: 88 MS
EKG QTC CALCULATION (BAZETT): 423 MS
EKG QTC CALCULATION (BAZETT): 452 MS
EKG R AXIS: -13 DEGREES
EKG R AXIS: -2 DEGREES
EKG T AXIS: -16 DEGREES
EKG T AXIS: 13 DEGREES
EKG VENTRICULAR RATE: 133 BPM
EKG VENTRICULAR RATE: 98 BPM

## 2025-04-29 PROCEDURE — 93010 ELECTROCARDIOGRAM REPORT: CPT | Performed by: INTERNAL MEDICINE

## 2025-04-29 NOTE — DISCHARGE SUMMARY
[facility]   IN-PATIENT SERVICE   [facility]    Discharge Summary     Patient ID: Leodan Randall  :  1945   MRN: [id number]     ACCOUNT:  [id number]   Patient's PCP: Jaci Hall MD  Admit Date: 2025   Discharge Date: 2025  Length of Stay: 2  Code Status:  Prior  Admitting Physician: Rober Zabala MD  Discharge Physician: Abdifatah Sahni MD     Active Discharge Diagnoses:       Primary Problem  New onset atrial fibrillation (HCC)      Hospital Problems  Active Hospital Problems    Diagnosis Date Noted    New onset atrial fibrillation (HCC) [I48.91] 2025       Admission Condition:  good     Discharged Condition: good    Hospital Stay:       Hospital Course:  Leodan Randall is a 79 y.o. male who was admitted for the management of   New onset atrial fibrillation (HCC) , presented to ER with Tachycardia and Palpitations (Patient reports his baseline HR is high 40's low 50's and he noticed today his heart rate is 140s. He denies chest pain/shortness of breath)    He has a past medical history of hypertension, prostate cancer status post radiotherapy, and urinary incontinence.  He came in with a complaint of increased heart rate.  The patient reported that a day before arriving to the ER, he was mowing his lawn, felt fatigued than usual.  He did not feel any shortness of breath, dizziness, sweating, chest pain, or an episode of unconsciousness.  This morning, he was taking his blood pressure, heart rate was in 150s.  He came to the ER because of abnormal heart rate.  On presentation, EKG showed atrial fibrillation/atrial flutter.  Cardiology was consulted, initially the plan was to start the patient on Cardizem drip.  Heart rhythm came back to sinus without any intervention.  Patient was started on heparin drip for anticoagulation.     He did not report any complaints of fever, cough, abdominal pain.  He did report having diarrhea this morning, 4 episodes,

## 2025-07-21 ENCOUNTER — OFFICE VISIT (OUTPATIENT)
Dept: PAIN MANAGEMENT | Age: 80
End: 2025-07-21
Payer: OTHER GOVERNMENT

## 2025-07-21 VITALS — BODY MASS INDEX: 24.34 KG/M2 | HEIGHT: 70 IN | WEIGHT: 170 LBS

## 2025-07-21 DIAGNOSIS — M79.18 MYOFASCIAL PAIN SYNDROME: ICD-10-CM

## 2025-07-21 DIAGNOSIS — M51.369 DEGENERATION OF INTERVERTEBRAL DISC OF LUMBAR REGION, UNSPECIFIED WHETHER PAIN PRESENT: ICD-10-CM

## 2025-07-21 DIAGNOSIS — M47.817 LUMBOSACRAL SPONDYLOSIS WITHOUT MYELOPATHY: Primary | ICD-10-CM

## 2025-07-21 PROCEDURE — 1123F ACP DISCUSS/DSCN MKR DOCD: CPT | Performed by: STUDENT IN AN ORGANIZED HEALTH CARE EDUCATION/TRAINING PROGRAM

## 2025-07-21 PROCEDURE — 99213 OFFICE O/P EST LOW 20 MIN: CPT | Performed by: STUDENT IN AN ORGANIZED HEALTH CARE EDUCATION/TRAINING PROGRAM
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
## (undated) DEVICE — FORCEPS BX L240CM WRK CHN 2.8MM STD CAP W/ NDL MIC MESH